# Patient Record
Sex: FEMALE | Race: BLACK OR AFRICAN AMERICAN | HISPANIC OR LATINO | Employment: FULL TIME | URBAN - NONMETROPOLITAN AREA
[De-identification: names, ages, dates, MRNs, and addresses within clinical notes are randomized per-mention and may not be internally consistent; named-entity substitution may affect disease eponyms.]

---

## 2022-09-12 ENCOUNTER — OFFICE VISIT (OUTPATIENT)
Dept: URGENT CARE | Facility: CLINIC | Age: 37
End: 2022-09-12
Payer: COMMERCIAL

## 2022-09-12 ENCOUNTER — APPOINTMENT (OUTPATIENT)
Dept: RADIOLOGY | Facility: CLINIC | Age: 37
End: 2022-09-12
Payer: COMMERCIAL

## 2022-09-12 VITALS
OXYGEN SATURATION: 94 % | HEART RATE: 78 BPM | TEMPERATURE: 98 F | RESPIRATION RATE: 18 BRPM | SYSTOLIC BLOOD PRESSURE: 141 MMHG | HEIGHT: 67 IN | BODY MASS INDEX: 36.1 KG/M2 | WEIGHT: 230 LBS | DIASTOLIC BLOOD PRESSURE: 72 MMHG

## 2022-09-12 DIAGNOSIS — M79.662 PAIN IN LEFT SHIN: ICD-10-CM

## 2022-09-12 DIAGNOSIS — M79.662 PAIN IN LEFT SHIN: Primary | ICD-10-CM

## 2022-09-12 PROCEDURE — 99203 OFFICE O/P NEW LOW 30 MIN: CPT

## 2022-09-12 PROCEDURE — 73590 X-RAY EXAM OF LOWER LEG: CPT

## 2022-09-12 RX ORDER — CHLORHEXIDINE GLUCONATE 0.12 MG/ML
RINSE ORAL
COMMUNITY
Start: 2022-09-06

## 2022-09-12 RX ORDER — NAPROXEN 500 MG/1
500 TABLET ORAL 2 TIMES DAILY WITH MEALS
Qty: 10 TABLET | Refills: 0 | Status: SHIPPED | OUTPATIENT
Start: 2022-09-12 | End: 2022-09-17

## 2022-09-12 RX ORDER — CLINDAMYCIN PALMITATE HYDROCHLORIDE 75 MG/5ML
SOLUTION ORAL
COMMUNITY
Start: 2022-09-06

## 2022-09-12 NOTE — PATIENT INSTRUCTIONS
Xr- no acute osseous abnormalities noted  Elevate legs  If swelling in one leg worsens, pain increases, shortness of breath develops proceed to ED

## 2022-09-12 NOTE — PROGRESS NOTES
[facility]        NAME: Donovan Mccarty is a 40 y o  female  : 1985    MRN: [id number]  DATE: 2022  TIME: 1:26 PM    Assessment and Plan   Pain in left shin [M79 [id number]]  1  Pain in left shin  XR tibia fibula 2 vw left    naproxen (Naprosyn) 500 mg tablet     Wells score -2  Patient requesting xr  Will perform given point tenderness  Xr: no acute osseous abnormality    Patient Instructions     Xr- no acute osseous abnormalities noted  Elevate legs  If swelling in one leg worsens, pain increases, shortness of breath develops proceed to ED  Follow up with PCP in 3-5 days  Proceed to  ER if symptoms worsen  Chief Complaint     Chief Complaint   Patient presents with    Ankle Pain    Joint Swelling     Left            History of Present Illness       Patient is a 40year old female who presents to the office today for left ankle swelling  States she noticed it two nights ago  Denies any history of blood clot in self or family  Denies hx of cancer, estrogen use  Denies recent travel  Had oral surgery 2 weeks ago for about 30 minutes  Denies any trauma to the foot or ankle  Review of Systems   Review of Systems   Constitutional: Negative for chills and fever  Respiratory: Negative for cough, shortness of breath and wheezing  Cardiovascular: Positive for leg swelling  Negative for chest pain and palpitations  Musculoskeletal: Negative for arthralgias, gait problem, myalgias, neck pain and neck stiffness  Skin: Negative for color change, rash and wound  All other systems reviewed and are negative          Current Medications       Current Outpatient Medications:     naproxen (Naprosyn) 500 mg tablet, Take 1 tablet (500 mg total) by mouth 2 (two) times a day with meals for 5 days, Disp: 10 tablet, Rfl: 0    chlorhexidine (PERIDEX) 0 12 % solution, , Disp: , Rfl:     clindamycin (CLEOCIN) 75 mg/5 mL solution, , Disp: , Rfl:     ibuprofen (MOTRIN) 100 mg/5 mL suspension, , Disp: , Rfl:     Current Allergies     Allergies as of 2022    (No Known Allergies)            The following portions of the patient's history were reviewed and updated as appropriate: allergies, current medications, past family history, past medical history, past social history, past surgical history and problem list      Past Medical History:   Diagnosis Date    Afib [facility])        Past Surgical History:   Procedure Laterality Date     SECTION      CHOLECYSTECTOMY         History reviewed  No pertinent family history  Medications have been verified  Objective   /72   Pulse 78   Temp 98 °F (36 7 °C)   Resp 18   Ht 5' 7" (1 702 m)   Wt 104 kg (230 lb)   LMP 2022   SpO2 94%   BMI 36 02 kg/m²        Physical Exam     Physical Exam  Vitals and nursing note reviewed  Constitutional:       General: She is not in acute distress  Appearance: Normal appearance  She is normal weight  She is not ill-appearing or toxic-appearing  Cardiovascular:      Rate and Rhythm: Normal rate and regular rhythm  Pulses: Normal pulses  Heart sounds: Normal heart sounds  Pulmonary:      Effort: Pulmonary effort is normal       Breath sounds: Normal breath sounds  Musculoskeletal:         General: Swelling and tenderness present  No deformity or signs of injury  Right lower leg: No deformity, lacerations, tenderness or bony tenderness  No edema  Left lower leg: Tenderness present  No deformity, lacerations or bony tenderness  No edema  Legs:    Skin:     General: Skin is warm  Capillary Refill: Capillary refill takes less than 2 seconds  Neurological:      General: No focal deficit present  Mental Status: She is alert and oriented to person, place, and time

## 2022-11-08 ENCOUNTER — APPOINTMENT (OUTPATIENT)
Dept: RADIOLOGY | Facility: CLINIC | Age: 37
End: 2022-11-08

## 2022-11-08 ENCOUNTER — OFFICE VISIT (OUTPATIENT)
Dept: URGENT CARE | Facility: CLINIC | Age: 37
End: 2022-11-08

## 2022-11-08 VITALS
SYSTOLIC BLOOD PRESSURE: 116 MMHG | WEIGHT: 235.2 LBS | DIASTOLIC BLOOD PRESSURE: 65 MMHG | BODY MASS INDEX: 36.84 KG/M2 | RESPIRATION RATE: 20 BRPM | HEART RATE: 66 BPM | OXYGEN SATURATION: 100 % | TEMPERATURE: 97.5 F

## 2022-11-08 DIAGNOSIS — M25.562 ACUTE PAIN OF LEFT KNEE: ICD-10-CM

## 2022-11-08 DIAGNOSIS — S83.8X2A SPRAIN OF OTHER LIGAMENT OF LEFT KNEE, INITIAL ENCOUNTER: Primary | ICD-10-CM

## 2022-11-08 NOTE — PATIENT INSTRUCTIONS
Knee Sprain   WHAT YOU NEED TO KNOW:   A knee sprain is a stretched or torn ligament in your knee  Ligaments support the knee and keep the joint and bones in the correct position  A knee sprain may involve one or more ligaments  DISCHARGE INSTRUCTIONS:   Return to the emergency department if:   Any part of your leg feels cold, numb, or looks pale  Call your doctor if:   You have new or increased swelling, bruising, or pain in your knee  Your symptoms do not improve within 6 weeks, even with treatment  You have questions or concerns about your condition or care  Medicines:   NSAIDs , such as ibuprofen, help decrease swelling, pain, and fever  This medicine is available with or without a doctor's order  NSAIDs can cause stomach bleeding or kidney problems in certain people  If you take blood thinner medicine, always ask your healthcare provider if NSAIDs are safe for you  Always read the medicine label and follow directions  Acetaminophen  decreases pain and fever  It is available without a doctor's order  Ask how much to take and how often to take it  Follow directions  Read the labels of all other medicines you are using to see if they also contain acetaminophen, or ask your doctor or pharmacist  Acetaminophen can cause liver damage if not taken correctly  Do not use more than 4 grams (4,000 milligrams) total of acetaminophen in one day  Prescription pain medicine  may be given  Ask your healthcare provider how to take this medicine safely  Some prescription pain medicines contain acetaminophen  Do not take other medicines that contain acetaminophen without talking to your healthcare provider  Too much acetaminophen may cause liver damage  Prescription pain medicine may cause constipation  Ask your healthcare provider how to prevent or treat constipation  Take your medicine as directed    Contact your healthcare provider if you think your medicine is not helping or if you have side effects  Tell him or her if you are allergic to any medicine  Keep a list of the medicines, vitamins, and herbs you take  Include the amounts, and when and why you take them  Bring the list or the pill bottles to follow-up visits  Carry your medicine list with you in case of an emergency  A support device  such as a splint or brace may be needed  These devices limit movement and protect the joint while it heals  You may be given crutches to use until you can stand on your injured leg without pain  Physical therapy  may be needed  A physical therapist teaches you exercises to help improve movement and strength, and to decrease pain  Manage a knee sprain:   Rest  your knee and do not exercise  Do not walk on your injured leg if you are told to keep weight off your knee  Rest helps decrease swelling and allows the injury to heal  You can do gentle range of motion exercises as directed to prevent stiffness  Apply ice  on your knee for 15 to 20 minutes every hour or as directed  Use an ice pack, or put crushed ice in a plastic bag  Cover the bag with a towel before you apply it  Ice helps prevent tissue damage and decreases swelling and pain  Apply compression  to your knee as directed  You may need to wear an elastic bandage  This helps keep your injured knee from moving too much while it heals  It should be tight enough to give support but so tight that it causes your toes to feel numb or tingly  Take the bandage off and rewrap it at least 1 time each day  Elevate your knee  above the level of your heart as often as you can  This will help decrease swelling and pain  Prop your leg on pillows or blankets to keep it elevated comfortably  Do not put pillows directly behind your knee  Prevent another knee sprain:  Exercise your legs to keep your muscles strong  Strong leg muscles help protect your knee and prevent strain   The following may also prevent a knee sprain:  Slowly start your exercise or training program   Slowly increase the time, distance, and intensity of your exercise  Sudden increases in training may cause another knee sprain  Wear protective braces and equipment as directed  Braces may prevent your knee from moving the wrong way and causing another sprain  Protective equipment may support your bones and ligaments to prevent injury  Warm up and stretch before exercise  Warm up by walking or using an exercise bike before starting your regular exercise  Do gentle stretches after warming up  This helps to loosen your muscles and decrease stress on your knee  Cool down and stretch after you exercise  Wear shoes that fit correctly and support your feet  Replace your running or exercise shoes before the padding or shock absorption is worn out  Ask your healthcare provider which exercise shoes are best for you  Ask if you should wear shoe inserts  Shoe inserts can help support your heels and arches or keep your foot lined up correctly in your shoes  Exercise on flat surfaces  Follow up with your doctor as directed:  Write down your questions so you remember to ask them during your visits  © Copyright [address] 2022 Information is for End User's use only and may not be sold, redistributed or otherwise used for commercial purposes  All illustrations and images included in CareNotes® are the copyrighted property of A D A M [employer] Inc  or Lizbeth Da Silva   The above information is an  only  It is not intended as medical advice for individual conditions or treatments  Talk to your doctor, nurse or pharmacist before following any medical regimen to see if it is safe and effective for you

## 2022-11-08 NOTE — PROGRESS NOTES
[facility]        NAME: Mikal James is a 40 y o  female  : 1985    MRN: [id number]  DATE: 2022  TIME: 9:23 AM    Assessment and Plan   Sprain of other ligament of left knee, initial encounter [S83 8X2A]  1  Sprain of other ligament of left knee, initial encounter  Ambulatory Referral to Physical Therapy   2  Acute pain of left knee  XR knee 3 vw left non injury         Patient Instructions   Patient Instructions     Knee Sprain   WHAT YOU NEED TO KNOW:   A knee sprain is a stretched or torn ligament in your knee  Ligaments support the knee and keep the joint and bones in the correct position  A knee sprain may involve one or more ligaments  DISCHARGE INSTRUCTIONS:   Return to the emergency department if:   · Any part of your leg feels cold, numb, or looks pale  Call your doctor if:   · You have new or increased swelling, bruising, or pain in your knee  · Your symptoms do not improve within 6 weeks, even with treatment  · You have questions or concerns about your condition or care  Medicines:   · NSAIDs , such as ibuprofen, help decrease swelling, pain, and fever  This medicine is available with or without a doctor's order  NSAIDs can cause stomach bleeding or kidney problems in certain people  If you take blood thinner medicine, always ask your healthcare provider if NSAIDs are safe for you  Always read the medicine label and follow directions  · Acetaminophen  decreases pain and fever  It is available without a doctor's order  Ask how much to take and how often to take it  Follow directions  Read the labels of all other medicines you are using to see if they also contain acetaminophen, or ask your doctor or pharmacist  Acetaminophen can cause liver damage if not taken correctly  Do not use more than 4 grams (4,000 milligrams) total of acetaminophen in one day  · Prescription pain medicine  may be given   Ask your healthcare provider how to take this medicine safely  Some prescription pain medicines contain acetaminophen  Do not take other medicines that contain acetaminophen without talking to your healthcare provider  Too much acetaminophen may cause liver damage  Prescription pain medicine may cause constipation  Ask your healthcare provider how to prevent or treat constipation  · Take your medicine as directed  Contact your healthcare provider if you think your medicine is not helping or if you have side effects  Tell him or her if you are allergic to any medicine  Keep a list of the medicines, vitamins, and herbs you take  Include the amounts, and when and why you take them  Bring the list or the pill bottles to follow-up visits  Carry your medicine list with you in case of an emergency  A support device  such as a splint or brace may be needed  These devices limit movement and protect the joint while it heals  You may be given crutches to use until you can stand on your injured leg without pain  Physical therapy  may be needed  A physical therapist teaches you exercises to help improve movement and strength, and to decrease pain  Manage a knee sprain:   · Rest  your knee and do not exercise  Do not walk on your injured leg if you are told to keep weight off your knee  Rest helps decrease swelling and allows the injury to heal  You can do gentle range of motion exercises as directed to prevent stiffness  · Apply ice  on your knee for 15 to 20 minutes every hour or as directed  Use an ice pack, or put crushed ice in a plastic bag  Cover the bag with a towel before you apply it  Ice helps prevent tissue damage and decreases swelling and pain  · Apply compression  to your knee as directed  You may need to wear an elastic bandage  This helps keep your injured knee from moving too much while it heals  It should be tight enough to give support but so tight that it causes your toes to feel numb or tingly   Take the bandage off and rewrap it at least 1 time each day  · Elevate your knee  above the level of your heart as often as you can  This will help decrease swelling and pain  Prop your leg on pillows or blankets to keep it elevated comfortably  Do not put pillows directly behind your knee  Prevent another knee sprain:  Exercise your legs to keep your muscles strong  Strong leg muscles help protect your knee and prevent strain  The following may also prevent a knee sprain:  · Slowly start your exercise or training program   Slowly increase the time, distance, and intensity of your exercise  Sudden increases in training may cause another knee sprain  · Wear protective braces and equipment as directed  Braces may prevent your knee from moving the wrong way and causing another sprain  Protective equipment may support your bones and ligaments to prevent injury  · Warm up and stretch before exercise  Warm up by walking or using an exercise bike before starting your regular exercise  Do gentle stretches after warming up  This helps to loosen your muscles and decrease stress on your knee  Cool down and stretch after you exercise  · Wear shoes that fit correctly and support your feet  Replace your running or exercise shoes before the padding or shock absorption is worn out  Ask your healthcare provider which exercise shoes are best for you  Ask if you should wear shoe inserts  Shoe inserts can help support your heels and arches or keep your foot lined up correctly in your shoes  Exercise on flat surfaces  Follow up with your doctor as directed:  Write down your questions so you remember to ask them during your visits  © Copyright [employer] 2022 Information is for End User's use only and may not be sold, redistributed or otherwise used for commercial purposes  All illustrations and images included in CareNotes® are the copyrighted property of [employer]  or Lizbeth Jenkins  The above information is an  only   It is not intended as medical advice for individual conditions or treatments  Talk to your doctor, nurse or pharmacist before following any medical regimen to see if it is safe and effective for you  Follow up with PCP in 3-5 days  Proceed to  ER if symptoms worsen  Chief Complaint     Chief Complaint   Patient presents with   • Knee Pain     Comes and goes  No injury  History of Present Illness       The patient presents to the clinic complaining of pain in the left knee for several months  She states that she also has been having bilateral lower leg swelling  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   She states the symptoms seem to be worse when she does a lot of physical work  She states that her swelling does improve when she elevates her leg  She denies any specific injury  She was seen in the clinic in September complaining of pain and swelling of the left lower leg and ankle  X-rays were done at that time that were negative  She states that she did have a follow-up with the primary care doctor and had an ultrasound that ruled out a DVT  She states that she does not like to take anti-inflammatories therefore she is not taking very many medications for her symptoms  She is not tried any braces for her symptoms  Her current pain level is proximally 8/10 and she describes the pain as a sharp pain  She denies chest pains, shortness of breath, or hemoptysis  Review of Systems   Review of Systems   Musculoskeletal: Positive for arthralgias and joint swelling  Skin: Negative for color change and wound  Current Medications     No current outpatient medications on file      Current Allergies     Allergies as of 11/08/2022   • (No Known Allergies)            The following portions of the patient's history were reviewed and updated as appropriate: allergies, current medications, past family history, past medical history, past social history, past surgical history and problem list      Past Medical History:   Diagnosis Date   • Afib ([address] )    • Anxiety        Past Surgical History:   Procedure Laterality Date   •  SECTION     • CHOLECYSTECTOMY         History reviewed  No pertinent family history  Medications have been verified  Objective   /65   Pulse 66   Temp 97 5 °F (36 4 °C)   Resp 20   Wt 107 kg (235 lb 3 2 oz)   SpO2 100%   BMI 36 84 kg/m²        Physical Exam     Physical Exam  Constitutional:       Appearance: Normal appearance  Musculoskeletal:      Left hip: No tenderness  Normal range of motion  Left upper leg: No swelling, edema or tenderness  Left knee: No swelling, erythema or bony tenderness  Normal range of motion  Tenderness present  Instability Tests: Anterior drawer test negative  Posterior drawer test negative  Anterior Lachman test negative  Medial Alex test negative and lateral Alex test negative  Left lower leg: No swelling or deformity  Left ankle: Normal       Left Achilles Tendon: No tenderness or defects  Atwood's test negative  Left foot: Normal range of motion and normal capillary refill  No swelling or deformity  Normal pulse  Legs:       Comments: -mild tenderness is noted in the lateral left knee   Neurological:      Mental Status: She is alert  -x-ray was reviewed  There is no acute fracture or significant arthritis noted  The patient will be treated with a neoprene knee brace  I will start her with physical therapy  I suggest ortho if symptoms fail to improve

## 2022-11-16 ENCOUNTER — EVALUATION (OUTPATIENT)
Dept: PHYSICAL THERAPY | Facility: CLINIC | Age: 37
End: 2022-11-16

## 2022-11-16 DIAGNOSIS — M25.512 CHRONIC LEFT SHOULDER PAIN: ICD-10-CM

## 2022-11-16 DIAGNOSIS — G89.29 CHRONIC LEFT SHOULDER PAIN: ICD-10-CM

## 2022-11-16 DIAGNOSIS — S83.8X2A SPRAIN OF OTHER LIGAMENT OF LEFT KNEE, INITIAL ENCOUNTER: Primary | ICD-10-CM

## 2022-11-16 NOTE — PROGRESS NOTES
PT Evaluation     Today's date: 2022  Patient name: Adalgisa Szymanski  : 1985  MRN: [id number]  Referring provider: Cheli Damico PA-C  Dx:   Encounter Diagnosis     ICD-10-CM    1  Sprain of other ligament of left knee, initial encounter  S83 8X2A Ambulatory Referral to Physical Therapy                     Assessment  Assessment details: Patient is a 40year old female who presents to PT with c/o pain in left shoulder and knee  PT examination shows limitations including weakness, limited stability, decreased postural awareness and increased pain and muscle tightness limiting functional ability  Patient would benefit from PT intervention including exercises for rom, strength and stability, functional training, manual therapy, HEP, balance training, postural training, aerobic conditioning and pain relieving modalities in order to maximize functional independence  Impairments: abnormal gait, abnormal muscle tone, activity intolerance, impaired balance, impaired physical strength, lacks appropriate home exercise program, pain with function and poor posture     Goals  ST  Initiate HEP  2  Patient to report decreased pain at worst by 50% in 4 weeks    LT  Patient to report decreased pain at worst by % in 8 weeks  2  Patient to increase left shoulder and knee strength to 5/5 in 8 weeks  3  Patient to improve postural awareness to [facility] in 8 weeks  4   Patient to return to normal functional activity in 8 weeks    Plan  Patient would benefit from: PT eval and skilled physical therapy  Planned modality interventions: cryotherapy, thermotherapy: hydrocollator packs, ultrasound and unattended electrical stimulation  Other planned modality interventions: Modalities PRN  Planned therapy interventions: IADL retraining, ADL retraining, joint mobilization, manual therapy, massage, balance, neuromuscular re-education, patient education, postural training, strengthening, stretching, therapeutic activities, therapeutic exercise, therapeutic training, functional ROM exercises, flexibility, graded activity, graded exercise and home exercise program  Frequency: 2x week  Duration in visits: 8  Duration in weeks: 4  Treatment plan discussed with: patient        Subjective Evaluation    History of Present Illness  Date of onset: 2022  Mechanism of injury: Patient presents to PT with c/o pain in left knee and left shoulder  Patient reports her left knee pain began about 3 months ago without known injury  She reports she gets the pain in the back of the calf and up into her posterior thigh  She states she feels the pain more at the end of the day and with increased activity  Patient also reports pain in left shoulder blade that she has had for at least 2 years  Patient is left hand dominant and uses her arm a lot with activity, especially painting  She reports she feels increased "knots" in her left shoulder blade  Patient is now referred to oppt  Pain  At best pain ratin  At worst pain ratin  Aggravating factors: walking, standing and stair climbing  Progression: worsening      Diagnostic Tests  X-ray: normal (Knee x-rays normal)  Patient Goals  Patient goal: Improved mobility        Objective     Concurrent Complaints  Negative for night pain, disturbed sleep, dizziness, faints, headaches, nausea/motion sickness, tinnitus, trouble swallowing, difficulty breathing, shortness of breath, respiratory pain, visual change, cardiac problem, kidney problem, gallbladder problem, stomach problem, ulcer, appendix problem, spleen problem, pancreas problem, history of cancer, history of trauma and infection    Static Posture     Head  Forward  Shoulders  Rounded  Palpation   Left   Muscle spasm in the cervical paraspinals, levator scapulae, rhomboids and upper trapezius       Neurological Testing     Sensation   Cervical/Thoracic   Left   Intact: light touch    Right   Intact: light touch    Knee   Left Knee   Intact: light touch    Right Knee   Intact: light touch     Active Range of Motion   Cervical/Thoracic Spine     Normal active range of motion  Left Knee   Normal active range of motion    Right Knee   Normal active range of motion    Strength/Myotome Testing   Cervical Spine     Right   Normal strength    Left Shoulder     Planes of Motion   Flexion: 4   Abduction: 4   External rotation at 0°: 4   Internal rotation at 0°: 4+     Left Knee   Flexion: 4  Extension: 4    Right Knee   Normal strength    Tests     Left Knee   Negative anterior Lachman, lateral Alex, medial Alex, posterior Lachman, valgus stress test at 0 degrees and varus stress test at 0 degrees  Ambulation     Observational Gait   Gait: antalgic   Decreased walking speed, stride length, left stance time and left step length       General Comments:    Upper quarter screen   Shoulder: unremarkable  Elbow: unremarkable  Hand/wrist: unremarkable  Neuro Exam:     Headaches   Patient reports headaches: No                  Precautions None       Manuals 11/16       Left LE Stretch 10 min                               Neuro Re-Ed         UBE-retro        MTP/LTP        Scaption        SLS        Tandem Stance                                                        Ther Ex        [facility]        TR/HR        Standing SLR 3-way        Shrugs        Bicep Curls        1/4 Squats                                                        Ther Activity                        Gait Training                        Modalities

## 2022-11-16 NOTE — LETTER
2022    Roly Hooper DO  [address] [place] [address]    Patient: Rose Weiss   YOB: 1985   Date of Visit: 2022     Encounter Diagnosis     ICD-10-CM    1  Sprain of other ligament of left knee, initial encounter  Q16 [id number] Ambulatory Referral to Physical Therapy     PT plan of care cert/re-cert      2  Chronic left shoulder pain  M25 512 PT plan of care cert/re-cert    D63 76           Dear Dr Aaron Burgos: Thank you for your recent referral of Rose Weiss  Please review the attached evaluation summary from [facility] recent visit  Please verify that you agree with the plan of care by signing the attached order  If you have any questions or concerns, please do not hesitate to call  I sincerely appreciate the opportunity to share in the care of one of your patients and hope to have another opportunity to work with you in the near future  Sincerely,    Rojas Eastman, PT      Referring Provider:      I certify that I have read the below Plan of Care and certify the need for these services furnished under this plan of treatment while under my care  Roly Hooper DO  [address] [place] [address]  Via Fax: [phone]          PT Evaluation     Today's date: 2022  Patient name: Rose Weiss  : 1985  MRN: [id number]  Referring provider: Nitin Lundy PA-C  Dx:   Encounter Diagnosis     ICD-10-CM    1  Sprain of other ligament of left knee, initial encounter  S83 8X2A Ambulatory Referral to Physical Therapy                     Assessment  Assessment details: Patient is a 40year old female who presents to PT with c/o pain in left shoulder and knee  PT examination shows limitations including weakness, limited stability, decreased postural awareness and increased pain and muscle tightness limiting functional ability   Patient would benefit from PT intervention including exercises for rom, strength and stability, functional training, manual therapy, HEP, balance training, postural training, aerobic conditioning and pain relieving modalities in order to maximize functional independence  Impairments: abnormal gait, abnormal muscle tone, activity intolerance, impaired balance, impaired physical strength, lacks appropriate home exercise program, pain with function and poor posture     Goals  ST  Initiate HEP  2  Patient to report decreased pain at worst by 50% in 4 weeks    LT  Patient to report decreased pain at worst by % in 8 weeks  2  Patient to increase left shoulder and knee strength to 5/5 in 8 weeks  3  Patient to improve postural awareness to [facility] in 8 weeks  4  Patient to return to normal functional activity in 8 weeks    Plan  Patient would benefit from: PT eval and skilled physical therapy  Planned modality interventions: cryotherapy, thermotherapy: hydrocollator packs, ultrasound and unattended electrical stimulation  Other planned modality interventions: Modalities PRN  Planned therapy interventions: IADL retraining, ADL retraining, joint mobilization, manual therapy, massage, balance, neuromuscular re-education, patient education, postural training, strengthening, stretching, therapeutic activities, therapeutic exercise, therapeutic training, functional ROM exercises, flexibility, graded activity, graded exercise and home exercise program  Frequency: 2x week  Duration in visits: 8  Duration in weeks: 4  Treatment plan discussed with: patient        Subjective Evaluation    History of Present Illness  Date of onset: 2022  Mechanism of injury: Patient presents to PT with c/o pain in left knee and left shoulder  Patient reports her left knee pain began about 3 months ago without known injury  She reports she gets the pain in the back of the calf and up into her posterior thigh  She states she feels the pain more at the end of the day and with increased activity   Patient also reports pain in left shoulder blade that she has had for at least 2 years  Patient is left hand dominant and uses her arm a lot with activity, especially painting  She reports she feels increased "knots" in her left shoulder blade  Patient is now referred to oppt  Pain  At best pain ratin  At worst pain ratin  Aggravating factors: walking, standing and stair climbing  Progression: worsening      Diagnostic Tests  X-ray: normal (Knee x-rays normal)  Patient Goals  Patient goal: Improved mobility        Objective     Concurrent Complaints  Negative for night pain, disturbed sleep, dizziness, faints, headaches, nausea/motion sickness, tinnitus, trouble swallowing, difficulty breathing, shortness of breath, respiratory pain, visual change, cardiac problem, kidney problem, gallbladder problem, stomach problem, ulcer, appendix problem, spleen problem, pancreas problem, history of cancer, history of trauma and infection    Static Posture     Head  Forward  Shoulders  Rounded  Palpation   Left   Muscle spasm in the cervical paraspinals, levator scapulae, rhomboids and upper trapezius  Neurological Testing     Sensation   Cervical/Thoracic   Left   Intact: light touch    Right   Intact: light touch    Knee   Left Knee   Intact: light touch    Right Knee   Intact: light touch     Active Range of Motion   Cervical/Thoracic Spine     Normal active range of motion  Left Knee   Normal active range of motion    Right Knee   Normal active range of motion    Strength/Myotome Testing   Cervical Spine     Right   Normal strength    Left Shoulder     Planes of Motion   Flexion: 4   Abduction: 4   External rotation at 0°: 4   Internal rotation at 0°: 4+     Left Knee   Flexion: 4  Extension: 4    Right Knee   Normal strength    Tests     Left Knee   Negative anterior Lachman, lateral Alex, medial Alex, posterior Lachman, valgus stress test at 0 degrees and varus stress test at 0 degrees       Ambulation Observational Gait   Gait: antalgic   Decreased walking speed, stride length, left stance time and left step length       General Comments:    Upper quarter screen   Shoulder: unremarkable  Elbow: unremarkable  Hand/wrist: unremarkable  Neuro Exam:     Headaches   Patient reports headaches: No                Precautions None       Manuals 11/16       Left LE Stretch 10 min                               Neuro Re-Ed         UBE-retro        MTP/LTP        Scaption        SLS        Tandem Stance                                                        Ther Ex        [address]        TR/HR        Standing SLR 3-way        Shrugs        Bicep Curls        1/4 Squats                                                        Ther Activity                        Gait Training                        Modalities

## 2022-11-21 ENCOUNTER — APPOINTMENT (OUTPATIENT)
Dept: PHYSICAL THERAPY | Facility: CLINIC | Age: 37
End: 2022-11-21

## 2022-11-23 ENCOUNTER — OFFICE VISIT (OUTPATIENT)
Dept: PHYSICAL THERAPY | Facility: CLINIC | Age: 37
End: 2022-11-23

## 2022-11-23 DIAGNOSIS — G89.29 CHRONIC LEFT SHOULDER PAIN: ICD-10-CM

## 2022-11-23 DIAGNOSIS — M25.512 CHRONIC LEFT SHOULDER PAIN: ICD-10-CM

## 2022-11-23 DIAGNOSIS — S83.8X2A SPRAIN OF OTHER LIGAMENT OF LEFT KNEE, INITIAL ENCOUNTER: Primary | ICD-10-CM

## 2022-11-23 NOTE — PROGRESS NOTES
Daily Note     Today's date: 2022  Patient name: Katarzyna Quinn  : 1985  MRN: [id number]  Referring provider: Jluis Pizano PA-C  Dx:   Encounter Diagnosis     ICD-10-CM    1  Sprain of other ligament of left knee, initial encounter  S83 8X2A       2  Chronic left shoulder pain  M25 512     G89 29                      Subjective: Patient reports pain left medial thoracic spine and left lumbar region secondary to painting  Objective: See treatment diary below      Assessment: Tolerated treatment fair  Patient had large adhesion along left rhomboid  Initiated E/S with MHP to decrease pain  Plan: Continue per plan of care          Precautions None       Manuals 22      Left LE Stretch 10 min       TFM/STM to left rhomboid and thoracic paraspinals  15 min                      Neuro Re-Ed         UBE-retro  5 min      MTP/LTP        Scaption        SLS        Tandem Stance                                                        Ther Ex        SRC        TR/HR        Standing SLR 3-way        Shrugs  2x10      Bicep Curls        1/4 Squats                                                        Ther Activity                        Gait Training                        Modalities

## 2022-12-01 ENCOUNTER — OFFICE VISIT (OUTPATIENT)
Dept: PHYSICAL THERAPY | Facility: CLINIC | Age: 37
End: 2022-12-01

## 2022-12-01 DIAGNOSIS — G89.29 CHRONIC LEFT SHOULDER PAIN: ICD-10-CM

## 2022-12-01 DIAGNOSIS — S83.8X2A SPRAIN OF OTHER LIGAMENT OF LEFT KNEE, INITIAL ENCOUNTER: Primary | ICD-10-CM

## 2022-12-01 DIAGNOSIS — M25.512 CHRONIC LEFT SHOULDER PAIN: ICD-10-CM

## 2022-12-01 NOTE — PROGRESS NOTES
Daily Note     Today's date: 2022  Patient name: Lizzette Olivas  : 1985  MRN: [id number]  Referring provider: Zac Jeffrey PA-C  Dx:   Encounter Diagnosis     ICD-10-CM    1  Sprain of other ligament of left knee, initial encounter  S83 8X2A       2  Chronic left shoulder pain  M25 512     G89 29                      Subjective: Patient reports improvement in left knee and in left shoulder  Objective: See treatment diary below      Assessment: Tolerated treatment well  Patient exhibited good technique with therapeutic exercises  Patient had decreased tightness/adhesions in left rhomboids/thoracic paraspinals  Plan: Continue per plan of care          Precautions None       Manuals 22     Left LE Stretch 10 min       TFM/STM to left rhomboid and thoracic paraspinals  15 min 15 min                     Neuro Re-Ed         UBE-retro  5 min 5 min     MTP/LTP   RTB 2x10 each     Scaption   2x10     SLS        Tandem Stance                                                        Ther Ex        SRC        TR/HR        Standing SLR 3-way        Shrugs  2x10 2x10     Bicep Curls        1/4 Squats                                                        Ther Activity                        Gait Training                        Modalities        MHP/ES to Left shoulder  15 min 15 min

## 2023-05-23 ENCOUNTER — OFFICE VISIT (OUTPATIENT)
Dept: URGENT CARE | Facility: CLINIC | Age: 38
End: 2023-05-23

## 2023-05-23 VITALS
BODY MASS INDEX: 36.84 KG/M2 | OXYGEN SATURATION: 97 % | DIASTOLIC BLOOD PRESSURE: 74 MMHG | SYSTOLIC BLOOD PRESSURE: 121 MMHG | HEART RATE: 82 BPM | HEIGHT: 67 IN | RESPIRATION RATE: 18 BRPM | TEMPERATURE: 97.8 F

## 2023-05-23 DIAGNOSIS — F41.9 ANXIETY: ICD-10-CM

## 2023-05-23 DIAGNOSIS — S83.8X2A SPRAIN OF OTHER LIGAMENT OF LEFT KNEE, INITIAL ENCOUNTER: Primary | ICD-10-CM

## 2023-05-23 NOTE — PROGRESS NOTES
[facility]        NAME: Quintin Delgado is a 45 y o  female  : 1985    MRN: [id number]  DATE: May 23, 2023  TIME: 9:37 AM    Assessment and Plan   Sprain of other ligament of left knee, initial encounter [S83 8X2A]  1  Sprain of other ligament of left knee, initial encounter  Ambulatory Referral to Physical Therapy    Ambulatory Referral to Orthopedic Surgery      2  Anxiety              Patient Instructions   Patient Instructions     Knee Sprain   WHAT YOU NEED TO KNOW:   A knee sprain is a stretched or torn ligament in your knee  Ligaments support the knee and keep the joint and bones in the correct position  A knee sprain may involve one or more ligaments  DISCHARGE INSTRUCTIONS:   Return to the emergency department if:   • Any part of your leg feels cold, numb, or looks pale  Call your doctor if:   • You have new or increased swelling, bruising, or pain in your knee  • Your symptoms do not improve within 6 weeks, even with treatment  • You have questions or concerns about your condition or care  Medicines:   • NSAIDs , such as ibuprofen, help decrease swelling, pain, and fever  This medicine is available with or without a doctor's order  NSAIDs can cause stomach bleeding or kidney problems in certain people  If you take blood thinner medicine, always ask your healthcare provider if NSAIDs are safe for you  Always read the medicine label and follow directions  • Acetaminophen  decreases pain and fever  It is available without a doctor's order  Ask how much to take and how often to take it  Follow directions  Read the labels of all other medicines you are using to see if they also contain acetaminophen, or ask your doctor or pharmacist  Acetaminophen can cause liver damage if not taken correctly  • Prescription pain medicine  may be given  Ask your healthcare provider how to take this medicine safely  Some prescription pain medicines contain acetaminophen   Do not take other medicines that contain acetaminophen without talking to your healthcare provider  Too much acetaminophen may cause liver damage  Prescription pain medicine may cause constipation  Ask your healthcare provider how to prevent or treat constipation  • Take your medicine as directed  Contact your healthcare provider if you think your medicine is not helping or if you have side effects  Tell your provider if you are allergic to any medicine  Keep a list of the medicines, vitamins, and herbs you take  Include the amounts, and when and why you take them  Bring the list or the pill bottles to follow-up visits  Carry your medicine list with you in case of an emergency  A support device  such as a splint or brace may be needed  These devices limit movement and protect the joint while it heals  You may be given crutches to use until you can stand on your injured leg without pain  Physical therapy  may be needed  A physical therapist teaches you exercises to help improve movement and strength, and to decrease pain  Manage a knee sprain:   • Rest  your knee and do not exercise  Do not walk on your injured leg if you are told to keep weight off your knee  Rest helps decrease swelling and allows the injury to heal  You can do gentle range of motion exercises as directed to prevent stiffness  • Apply ice  on your knee for 15 to 20 minutes every hour or as directed  Use an ice pack, or put crushed ice in a plastic bag  Cover the bag with a towel before you apply it  Ice helps prevent tissue damage and decreases swelling and pain  • Apply compression  to your knee as directed  You may need to wear an elastic bandage  This helps keep your injured knee from moving too much while it heals  It should be tight enough to give support but so tight that it causes your toes to feel numb or tingly  Take the bandage off and rewrap it at least 1 time each day           • Elevate your knee  above the level of your heart as often as you can  This will help decrease swelling and pain  Prop your leg on pillows or blankets to keep it elevated comfortably  Do not put pillows directly behind your knee  Prevent another knee sprain:  Exercise your legs to keep your muscles strong  Strong leg muscles help protect your knee and prevent strain  The following may also prevent a knee sprain:  • Slowly start your exercise or training program   Slowly increase the time, distance, and intensity of your exercise  Sudden increases in training may cause another knee sprain  • Wear protective braces and equipment as directed  Braces may prevent your knee from moving the wrong way and causing another sprain  Protective equipment may support your bones and ligaments to prevent injury  • Warm up and stretch before exercise  Warm up by walking or using an exercise bike before starting your regular exercise  Do gentle stretches after warming up  This helps to loosen your muscles and decrease stress on your knee  Cool down and stretch after you exercise  • Wear shoes that fit correctly and support your feet  Replace your running or exercise shoes before the padding or shock absorption is worn out  Ask your healthcare provider which exercise shoes are best for you  Ask if you should wear shoe inserts  Shoe inserts can help support your heels and arches or keep your foot lined up correctly in your shoes  Exercise on flat surfaces  Follow up with your doctor as directed:  Write down your questions so you remember to ask them during your visits  © Copyright Tracy Conner 2022 Information is for End User's use only and may not be sold, redistributed or otherwise used for commercial purposes  The above information is an  only  It is not intended as medical advice for individual conditions or treatments   Talk to your doctor, nurse or pharmacist before following any medical regimen to see if it is safe and effective for you   Anxiety   WHAT YOU NEED TO KNOW:   Anxiety is a condition that causes you to feel extremely worried or nervous  The feelings are so strong that they can cause problems with your daily activities or sleep  Anxiety may be triggered by something you fear, or it may happen without a cause  Family or work stress, smoking, caffeine, and alcohol can increase your risk for anxiety  Certain medicines or health conditions can also increase your risk  Anxiety can become a long-term condition if it is not managed or treated  DISCHARGE INSTRUCTIONS:   Call your local emergency number (911 in the [address] [facility]) if:   • You have chest pain, tightness, or heaviness that may spread to your shoulders, arms, jaw, neck, or back  • You feel like hurting yourself or someone else  Call your doctor if:   • Your symptoms get worse or do not get better with treatment  • Your anxiety keeps you from doing your regular daily activities  • You have new symptoms since your last visit  • You have questions or concerns about your condition or care  Medicines:   • Medicines  may be given to help you feel more calm and relaxed, and decrease your symptoms  • Take your medicine as directed  Contact your healthcare provider if you think your medicine is not helping or if you have side effects  Tell your provider if you are allergic to any medicine  Keep a list of the medicines, vitamins, and herbs you take  Include the amounts, and when and why you take them  Bring the list or the pill bottles to follow-up visits  Carry your medicine list with you in case of an emergency  Manage anxiety:   • Talk to someone about your anxiety  Your healthcare provider may suggest counseling  Cognitive behavioral therapy can help you understand and change how you react to events that trigger your symptoms  You might feel more comfortable talking with a friend or family member about your anxiety   Choose someone you know will be supportive and encouraging  • Find ways to relax  Activities such as exercise, meditation, or listening to music can help you relax  Spend time with friends, or do things you enjoy  • Practice deep breathing  Deep breathing can help you relax when you feel anxious  Focus on taking slow, deep breaths several times a day, or during an anxiety attack  Breathe in through your nose and out through your mouth  • Create a regular sleep routine  Regular sleep can help you feel calmer during the day  Go to sleep and wake up at the same times every day  Do not watch television or use the computer right before bed  Your room should be comfortable, dark, and quiet  • Eat a variety of healthy foods  Healthy foods include fruits, vegetables, low-fat dairy products, lean meats, fish, whole-grain breads, and cooked beans  Healthy foods can help you feel less anxious and have more energy  • Exercise regularly  Exercise can increase your energy level  Exercise may also lift your mood and help you sleep better  Your healthcare provider can help you create an exercise plan  • Do not smoke  Nicotine and other chemicals in cigarettes and cigars can increase anxiety  Ask your healthcare provider for information if you currently smoke and need help to quit  E-cigarettes or smokeless tobacco still contain nicotine  Talk to your healthcare provider before you use these products  • Do not have caffeine  Caffeine can make your symptoms worse  Do not have foods or drinks that are meant to increase your energy level  • Limit or do not drink alcohol  Ask your healthcare provider if alcohol is safe for you  You may not be able to drink alcohol if you take certain anxiety or depression medicines  Limit alcohol to 1 drink per day if you are a woman  Limit alcohol to 2 drinks per day if you are a man  A drink of alcohol is 12 ounces of beer, 5 ounces of wine, or 1½ ounces of liquor  • Do not use drugs    Drugs can make your anxiety worse  It can also make anxiety hard to manage  Talk to your healthcare provider if you use drugs and want help to quit  Follow up with your doctor within 2 weeks or as directed:  Write down your questions so you remember to ask them during your visits  © Copyright Cecille Pleitez 2022 Information is for End User's use only and may not be sold, redistributed or otherwise used for commercial purposes  The above information is an  only  It is not intended as medical advice for individual conditions or treatments  Talk to your doctor, nurse or pharmacist before following any medical regimen to see if it is safe and effective for you  Follow up with PCP in 3-5 days  Proceed to  ER if symptoms worsen  Chief Complaint     Chief Complaint   Patient presents with   • Knee Pain     Left knee pain     • Anxiety         History of Present Illness       The patient is a 40-year-old female who presents to the clinic complaining of pain and swelling of the left knee  She was seen in November for the same symptoms  She had x-rays at that time that did not show any abnormal teas  She was referred to physical therapy for further evaluation  She states that she went through 5 sessions which did not completely resolve her symptoms but she did not finish the physical therapy as her son has autism and seizure disorder and she has to care 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 and has a hard time keeping appointments for herself  She states that she also gets associated swelling of her knee as well as associated swelling of her left ankle  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 due to her son's health issues  She also complains of associated instability at times  She only takes ibuprofen as needed  She states the pain can become sharp and very painful at times  She is also having anxiety but is not really interested in taking any medications    She complains of flushing "of her face and is concerned that this may be related to her anxiety but wanted to have her blood pressure checked today  She states that she has a hard time getting in to see her primary care doctor for follow-ups as they really have appointments available  Review of Systems   Review of Systems   Musculoskeletal: Positive for arthralgias and gait problem  Negative for joint swelling, myalgias, neck pain and neck stiffness  Skin: Negative for color change, pallor and rash  Neurological: Negative for dizziness and numbness  Psychiatric/Behavioral: Negative for agitation, behavioral problems, confusion, decreased concentration and dysphoric mood  The patient is nervous/anxious  Current Medications     No current outpatient medications on file  Current Allergies     Allergies as of 2023   • (No Known Allergies)            The following portions of the patient's history were reviewed and updated as appropriate: allergies, current medications, past family history, past medical history, past social history, past surgical history and problem list      Past Medical History:   Diagnosis Date   • Afib ([facility] [address] )    • Anxiety        Past Surgical History:   Procedure Laterality Date   •  SECTION     • CHOLECYSTECTOMY         History reviewed  No pertinent family history  Medications have been verified  Objective   /74   Pulse 82   Temp 97 8 °F (36 6 °C)   Resp 18   Ht 5' 7\" (1 702 m)   SpO2 97%   BMI 36 84 kg/m²        Physical Exam     Physical Exam  Constitutional:       Appearance: Normal appearance  Musculoskeletal:      Left hip: Normal       Left knee: Crepitus present  No swelling or erythema  Normal range of motion  Tenderness present  Normal alignment  Left lower leg: Normal       Left ankle: Normal       Left foot: Normal         Legs:       Comments: Mild tenderness is noted in the medial aspect of the left knee    There is no crepitus or decreased range " of motion  Neurological:      Mental Status: She is alert  Psychiatric:         Mood and Affect: Mood normal          Behavior: Behavior normal          Judgment: Judgment normal            Referred order to restart physical therapy  Placed a consult for Ortho since her knee pain has been going on for several months  The patient was instructed to follow-up with her primary care doctor for anxiety  I did give her information on anxiety

## 2023-05-23 NOTE — PATIENT INSTRUCTIONS
Knee Sprain   WHAT YOU NEED TO KNOW:   A knee sprain is a stretched or torn ligament in your knee  Ligaments support the knee and keep the joint and bones in the correct position  A knee sprain may involve one or more ligaments  DISCHARGE INSTRUCTIONS:   Return to the emergency department if:   Any part of your leg feels cold, numb, or looks pale  Call your doctor if:   You have new or increased swelling, bruising, or pain in your knee  Your symptoms do not improve within 6 weeks, even with treatment  You have questions or concerns about your condition or care  Medicines:   NSAIDs , such as ibuprofen, help decrease swelling, pain, and fever  This medicine is available with or without a doctor's order  NSAIDs can cause stomach bleeding or kidney problems in certain people  If you take blood thinner medicine, always ask your healthcare provider if NSAIDs are safe for you  Always read the medicine label and follow directions  Acetaminophen  decreases pain and fever  It is available without a doctor's order  Ask how much to take and how often to take it  Follow directions  Read the labels of all other medicines you are using to see if they also contain acetaminophen, or ask your doctor or pharmacist  Acetaminophen can cause liver damage if not taken correctly  Prescription pain medicine  may be given  Ask your healthcare provider how to take this medicine safely  Some prescription pain medicines contain acetaminophen  Do not take other medicines that contain acetaminophen without talking to your healthcare provider  Too much acetaminophen may cause liver damage  Prescription pain medicine may cause constipation  Ask your healthcare provider how to prevent or treat constipation  Take your medicine as directed  Contact your healthcare provider if you think your medicine is not helping or if you have side effects  Tell your provider if you are allergic to any medicine   Keep a list of the medicines, vitamins, and herbs you take  Include the amounts, and when and why you take them  Bring the list or the pill bottles to follow-up visits  Carry your medicine list with you in case of an emergency  A support device  such as a splint or brace may be needed  These devices limit movement and protect the joint while it heals  You may be given crutches to use until you can stand on your injured leg without pain  Physical therapy  may be needed  A physical therapist teaches you exercises to help improve movement and strength, and to decrease pain  Manage a knee sprain:   Rest  your knee and do not exercise  Do not walk on your injured leg if you are told to keep weight off your knee  Rest helps decrease swelling and allows the injury to heal  You can do gentle range of motion exercises as directed to prevent stiffness  Apply ice  on your knee for 15 to 20 minutes every hour or as directed  Use an ice pack, or put crushed ice in a plastic bag  Cover the bag with a towel before you apply it  Ice helps prevent tissue damage and decreases swelling and pain  Apply compression  to your knee as directed  You may need to wear an elastic bandage  This helps keep your injured knee from moving too much while it heals  It should be tight enough to give support but so tight that it causes your toes to feel numb or tingly  Take the bandage off and rewrap it at least 1 time each day  Elevate your knee  above the level of your heart as often as you can  This will help decrease swelling and pain  Prop your leg on pillows or blankets to keep it elevated comfortably  Do not put pillows directly behind your knee  Prevent another knee sprain:  Exercise your legs to keep your muscles strong  Strong leg muscles help protect your knee and prevent strain   The following may also prevent a knee sprain:  Slowly start your exercise or training program   Slowly increase the time, distance, and intensity of your exercise  Sudden increases in training may cause another knee sprain  Wear protective braces and equipment as directed  Braces may prevent your knee from moving the wrong way and causing another sprain  Protective equipment may support your bones and ligaments to prevent injury  Warm up and stretch before exercise  Warm up by walking or using an exercise bike before starting your regular exercise  Do gentle stretches after warming up  This helps to loosen your muscles and decrease stress on your knee  Cool down and stretch after you exercise  Wear shoes that fit correctly and support your feet  Replace your running or exercise shoes before the padding or shock absorption is worn out  Ask your healthcare provider which exercise shoes are best for you  Ask if you should wear shoe inserts  Shoe inserts can help support your heels and arches or keep your foot lined up correctly in your shoes  Exercise on flat surfaces  Follow up with your doctor as directed:  Write down your questions so you remember to ask them during your visits  © Copyright Eric Mcnally 2022 Information is for End User's use only and may not be sold, redistributed or otherwise used for commercial purposes  The above information is an  only  It is not intended as medical advice for individual conditions or treatments  Talk to your doctor, nurse or pharmacist before following any medical regimen to see if it is safe and effective for you  Anxiety   WHAT YOU NEED TO KNOW:   Anxiety is a condition that causes you to feel extremely worried or nervous  The feelings are so strong that they can cause problems with your daily activities or sleep  Anxiety may be triggered by something you fear, or it may happen without a cause  Family or work stress, smoking, caffeine, and alcohol can increase your risk for anxiety  Certain medicines or health conditions can also increase your risk   Anxiety can become a long-term condition if it is not managed or treated  DISCHARGE INSTRUCTIONS:   Call your local emergency number (911 in the [address]) if:   You have chest pain, tightness, or heaviness that may spread to your shoulders, arms, jaw, neck, or back  You feel like hurting yourself or someone else  Call your doctor if:   Your symptoms get worse or do not get better with treatment  Your anxiety keeps you from doing your regular daily activities  You have new symptoms since your last visit  You have questions or concerns about your condition or care  Medicines:   Medicines  may be given to help you feel more calm and relaxed, and decrease your symptoms  Take your medicine as directed  Contact your healthcare provider if you think your medicine is not helping or if you have side effects  Tell your provider if you are allergic to any medicine  Keep a list of the medicines, vitamins, and herbs you take  Include the amounts, and when and why you take them  Bring the list or the pill bottles to follow-up visits  Carry your medicine list with you in case of an emergency  Manage anxiety:   Talk to someone about your anxiety  Your healthcare provider may suggest counseling  Cognitive behavioral therapy can help you understand and change how you react to events that trigger your symptoms  You might feel more comfortable talking with a friend or family member about your anxiety  Choose someone you know will be supportive and encouraging  Find ways to relax  Activities such as exercise, meditation, or listening to music can help you relax  Spend time with friends, or do things you enjoy  Practice deep breathing  Deep breathing can help you relax when you feel anxious  Focus on taking slow, deep breaths several times a day, or during an anxiety attack  Breathe in through your nose and out through your mouth  Create a regular sleep routine  Regular sleep can help you feel calmer during the day   Go to sleep and wake up at the same times every day  Do not watch television or use the computer right before bed  Your room should be comfortable, dark, and quiet  Eat a variety of healthy foods  Healthy foods include fruits, vegetables, low-fat dairy products, lean meats, fish, whole-grain breads, and cooked beans  Healthy foods can help you feel less anxious and have more energy  Exercise regularly  Exercise can increase your energy level  Exercise may also lift your mood and help you sleep better  Your healthcare provider can help you create an exercise plan  Do not smoke  Nicotine and other chemicals in cigarettes and cigars can increase anxiety  Ask your healthcare provider for information if you currently smoke and need help to quit  E-cigarettes or smokeless tobacco still contain nicotine  Talk to your healthcare provider before you use these products  Do not have caffeine  Caffeine can make your symptoms worse  Do not have foods or drinks that are meant to increase your energy level  Limit or do not drink alcohol  Ask your healthcare provider if alcohol is safe for you  You may not be able to drink alcohol if you take certain anxiety or depression medicines  Limit alcohol to 1 drink per day if you are a woman  Limit alcohol to 2 drinks per day if you are a man  A drink of alcohol is 12 ounces of beer, 5 ounces of wine, or 1½ ounces of liquor  Do not use drugs  Drugs can make your anxiety worse  It can also make anxiety hard to manage  Talk to your healthcare provider if you use drugs and want help to quit  Follow up with your doctor within 2 weeks or as directed:  Write down your questions so you remember to ask them during your visits  © Copyright Vivien Fails 2022 Information is for End User's use only and may not be sold, redistributed or otherwise used for commercial purposes  The above information is an  only   It is not intended as medical advice for individual conditions or treatments  Talk to your doctor, nurse or pharmacist before following any medical regimen to see if it is safe and effective for you

## 2023-05-25 ENCOUNTER — OFFICE VISIT (OUTPATIENT)
Dept: OBGYN CLINIC | Facility: CLINIC | Age: 38
End: 2023-05-25

## 2023-05-25 VITALS
BODY MASS INDEX: 36.88 KG/M2 | DIASTOLIC BLOOD PRESSURE: 71 MMHG | HEART RATE: 78 BPM | HEIGHT: 67 IN | WEIGHT: 235 LBS | SYSTOLIC BLOOD PRESSURE: 105 MMHG

## 2023-05-25 DIAGNOSIS — M17.12 PRIMARY OSTEOARTHRITIS OF LEFT KNEE: Primary | ICD-10-CM

## 2023-05-25 DIAGNOSIS — S83.8X2A SPRAIN OF OTHER LIGAMENT OF LEFT KNEE, INITIAL ENCOUNTER: ICD-10-CM

## 2023-05-25 NOTE — PROGRESS NOTES
Assessment/Plan:   Diagnoses and all orders for this visit:    Primary osteoarthritis of left knee  -     Ambulatory Referral to Physical Therapy; Future    Sprain of other ligament of left knee, initial encounter  -     Ambulatory Referral to Orthopedic Surgery         Reviewed physical exam and imaging with patient on today's exam  Her symptoms are consistent with mild osteoarthritis of her left knee  Referred to physical therapy to improve strength and proprioception  A cortisone injection was offered, but the patient declined at this time  She will follow-up in 3 months for re-evaluation following physical therapy  The patient expresses understanding and is in agreement with today's treatment plan  The patient has osteoarthritis of her left knee  There is no specific joint line tenderness  Mild crepitations present  Treatment options were discussed  She was not interested in any injections  She wants to go back to therapy  A new prescription was written  Follow-up 3 months evaluation  If her condition changes, she would not hesitate to let us know    Subjective:   Patient ID: Judy Garcia  1985     HPI  Patient is a 45 y o  female who presents for initial evaluation of her left knee  The patient reported to Urgent Care on 5/23/23 for a sprain of her left knee  The patient also reported to Urgent Care on 11/8/22 for the same injury  She completed 3 physical therapy appointments throughout November 2022  On today's visit, she reports pain in her lateral and anterior knee  She states that she has pain with increased activity  The patient states that she works from work and has periods of prolonged sitting, which increases her pain  She states that she enjoyed physical therapy but she did not complete it for a long period of time  She denies numbness, tingling, weakness, feelings of instability, fever, chills, or malaise         The following portions of the patient's history were reviewed and updated as appropriate:  Past medical history, past surgical history, Family history, social history, current medications and allergies    Past Medical History:   Diagnosis Date   • Afib ([facility] [address] )    • Anxiety        Past Surgical History:   Procedure Laterality Date   •  SECTION     • CHOLECYSTECTOMY         History reviewed  No pertinent family history  Social History     Socioeconomic History   • Marital status: Single     Spouse name: None   • Number of children: None   • Years of education: None   • Highest education level: None   Occupational History   • None   Tobacco Use   • Smoking status: Former   • Smokeless tobacco: Never   Vaping Use   • Vaping Use: Every day   • Substances: Flavoring   Substance and Sexual Activity   • Alcohol use: Not Currently   • Drug use: Never   • Sexual activity: None   Other Topics Concern   • None   Social History Narrative   • None     Social Determinants of Health     Financial Resource Strain: Not on file   Food Insecurity: Not on file   Transportation Needs: Not on file   Physical Activity: Not on file   Stress: Not on file   Social Connections: Not on file   Intimate Partner Violence: Not on file   Housing Stability: Not on file       No current outpatient medications on file  No Known Allergies    Review of Systems   Constitutional: Negative for chills, fever and unexpected weight change  HENT: Negative for hearing loss, nosebleeds and sore throat  Eyes: Negative for pain, redness and visual disturbance  Respiratory: Negative for cough, shortness of breath and wheezing  Cardiovascular: Negative for chest pain, palpitations and leg swelling  Gastrointestinal: Negative for abdominal pain, nausea and vomiting  Endocrine: Negative for polydipsia and polyuria  Genitourinary: Negative for dysuria and hematuria  Skin: Negative for rash and wound  Neurological: Negative for dizziness, numbness and headaches     Psychiatric/Behavioral: Negative for "decreased concentration and suicidal ideas  The patient is not nervous/anxious  All other systems reviewed and are negative  Objective:  /71   Pulse 78   Ht 5' 7\" (1 702 m)   Wt 107 kg (235 lb)   BMI 36 81 kg/m²     Ortho Exam  left knee(s) -   Patient ambulates with steady gait pattern  Uses No assistive device  No anatomical deformity  Skin is warm and dry to touch with no signs of erythema, ecchymosis, or infection  No soft tissue swelling or effusion noted  ROM (5° - 115°)   MMT: 5/5 throughout  No tenderness to palpation on exam  Flexor and extensor mechanisms are intact   Knee is stable to varus and valgus stress  - Lachman's  - Anterior Drawer, - Posterior Drawer  - Medial Alex's, - Lateral Alex's  - Pivot Shift  Patella tracks centrally with palpable crepitus  Calf compartments are soft and supple  - Daniel's sign  2+ DP and PT pulses with brisk capillary refill to the toes  Sural, saphenous, tibial, superficial, and deep peroneal motor and sensory distributions intact  Sensation light touch intact distally      Physical Exam  HENT:      Head: Normocephalic and atraumatic  Nose: Nose normal    Eyes:      Conjunctiva/sclera: Conjunctivae normal    Cardiovascular:      Rate and Rhythm: Normal rate  Pulmonary:      Effort: Pulmonary effort is normal    Musculoskeletal:      Cervical back: Neck supple  Skin:     General: Skin is warm and dry  Capillary Refill: Capillary refill takes less than 2 seconds  Neurological:      Mental Status: She is alert and oriented to person, place, and time  Psychiatric:         Mood and Affect: Mood normal          Behavior: Behavior normal           Diagnostic Test Review: The attending physician has personally reviewed the pertinent films in PACS and the interpretation is as follows:    X-Ray of left knee taken on 11/8/23 were reviewed and showed no acute osseous abnormalities or fractures  Procedures   None performed      Scribe " Attestation    I,:  Yary Umana am acting as a scribe while in the presence of the attending physician :       I,:  Jet Joshi, DO personally performed the services described in this documentation    as scribed in my presence :

## 2023-05-25 NOTE — LETTER
May 25, 2023     Rosemarie Lentz PA-C  [address] [facility]    Patient: Jaylyn Patricia   YOB: 1985   Date of Visit: 5/25/2023       Dear Dr Omar Leger: Thank you for referring Jaylyn Patricia to me for evaluation  Below are my notes for this consultation  If you have questions, please do not hesitate to call me  I look forward to following your patient along with you  Sincerely,        Santos Breath, DO        CC: No Recipients    Santos Breath, DO  5/25/2023  9:03 AM  Signed  Assessment/Plan:   Diagnoses and all orders for this visit:    Primary osteoarthritis of left knee  -     Ambulatory Referral to Physical Therapy; Future    Sprain of other ligament of left knee, initial encounter  -     Ambulatory Referral to Orthopedic Surgery         Reviewed physical exam and imaging with patient on today's exam  Her symptoms are consistent with mild osteoarthritis of her left knee  Referred to physical therapy to improve strength and proprioception  A cortisone injection was offered, but the patient declined at this time  She will follow-up in 3 months for re-evaluation following physical therapy  The patient expresses understanding and is in agreement with today's treatment plan  The patient has osteoarthritis of her left knee  There is no specific joint line tenderness  Mild crepitations present  Treatment options were discussed  She was not interested in any injections  She wants to go back to therapy  A new prescription was written  Follow-up 3 months evaluation  If her condition changes, she would not hesitate to let us know    Subjective:   Patient ID: Jaylyn Patricia  1985     HPI  Patient is a 45 y o  female who presents for initial evaluation of her left knee  The patient reported to Urgent Care on 5/23/23 for a sprain of her left knee  The patient also reported to Urgent Care on 11/8/22 for the same injury   She completed 3 physical therapy appointments throughout 2022  On today's visit, she reports pain in her lateral and anterior knee  She states that she has pain with increased activity  The patient states that she works from work and has periods of prolonged sitting, which increases her pain  She states that she enjoyed physical therapy but she did not complete it for a long period of time  She denies numbness, tingling, weakness, feelings of instability, fever, chills, or malaise  The following portions of the patient's history were reviewed and updated as appropriate:  Past medical history, past surgical history, Family history, social history, current medications and allergies    Past Medical History:   Diagnosis Date   • Afib ([facility] [address] )    • Anxiety        Past Surgical History:   Procedure Laterality Date   •  SECTION     • CHOLECYSTECTOMY         History reviewed  No pertinent family history  Social History     Socioeconomic History   • Marital status: Single     Spouse name: None   • Number of children: None   • Years of education: None   • Highest education level: None   Occupational History   • None   Tobacco Use   • Smoking status: Former   • Smokeless tobacco: Never   Vaping Use   • Vaping Use: Every day   • Substances: Flavoring   Substance and Sexual Activity   • Alcohol use: Not Currently   • Drug use: Never   • Sexual activity: None   Other Topics Concern   • None   Social History Narrative   • None     Social Determinants of Health     Financial Resource Strain: Not on file   Food Insecurity: Not on file   Transportation Needs: Not on file   Physical Activity: Not on file   Stress: Not on file   Social Connections: Not on file   Intimate Partner Violence: Not on file   Housing Stability: Not on file       No current outpatient medications on file  No Known Allergies    Review of Systems   Constitutional: Negative for chills, fever and unexpected weight change     HENT: Negative for hearing loss, nosebleeds and "sore throat  Eyes: Negative for pain, redness and visual disturbance  Respiratory: Negative for cough, shortness of breath and wheezing  Cardiovascular: Negative for chest pain, palpitations and leg swelling  Gastrointestinal: Negative for abdominal pain, nausea and vomiting  Endocrine: Negative for polydipsia and polyuria  Genitourinary: Negative for dysuria and hematuria  Skin: Negative for rash and wound  Neurological: Negative for dizziness, numbness and headaches  Psychiatric/Behavioral: Negative for decreased concentration and suicidal ideas  The patient is not nervous/anxious  All other systems reviewed and are negative  Objective:  /71   Pulse 78   Ht 5' 7\" (1 702 m)   Wt 107 kg (235 lb)   BMI 36 81 kg/m²     Ortho Exam  left knee(s) -   Patient ambulates with steady gait pattern  Uses No assistive device  No anatomical deformity  Skin is warm and dry to touch with no signs of erythema, ecchymosis, or infection  No soft tissue swelling or effusion noted  ROM (5° - 115°)   MMT: 5/5 throughout  No tenderness to palpation on exam  Flexor and extensor mechanisms are intact   Knee is stable to varus and valgus stress  - Lachman's  - Anterior Drawer, - Posterior Drawer  - Medial Alex's, - Lateral Alex's  - Pivot Shift  Patella tracks centrally with palpable crepitus  Calf compartments are soft and supple  - Daniel's sign  2+ DP and PT pulses with brisk capillary refill to the toes  Sural, saphenous, tibial, superficial, and deep peroneal motor and sensory distributions intact  Sensation light touch intact distally      Physical Exam  HENT:      Head: Normocephalic and atraumatic  Nose: Nose normal    Eyes:      Conjunctiva/sclera: Conjunctivae normal    Cardiovascular:      Rate and Rhythm: Normal rate  Pulmonary:      Effort: Pulmonary effort is normal    Musculoskeletal:      Cervical back: Neck supple  Skin:     General: Skin is warm and dry        Capillary " Refill: Capillary refill takes less than 2 seconds  Neurological:      Mental Status: She is alert and oriented to person, place, and time  Psychiatric:         Mood and Affect: Mood normal          Behavior: Behavior normal           Diagnostic Test Review: The attending physician has personally reviewed the pertinent films in PACS and the interpretation is as follows:    X-Ray of left knee taken on 11/8/23 were reviewed and showed no acute osseous abnormalities or fractures  Procedures   None performed      Scribe Attestation      I,:  Griselda Penn am acting as a scribe while in the presence of the attending physician :       I,:  Azell Schlatter, DO personally performed the services described in this documentation    as scribed in my presence :

## 2023-05-30 ENCOUNTER — TELEPHONE (OUTPATIENT)
Dept: OBGYN CLINIC | Facility: HOSPITAL | Age: 38
End: 2023-05-30

## 2023-05-30 NOTE — TELEPHONE ENCOUNTER
Caller: Patient    Doctor: Ansley Cruz    Reason for call: Patient is still having pain would like to know if an MRI can be ordered?     Call back#: [phone]

## 2023-06-29 ENCOUNTER — EVALUATION (OUTPATIENT)
Dept: PHYSICAL THERAPY | Facility: CLINIC | Age: 38
End: 2023-06-29
Payer: COMMERCIAL

## 2023-06-29 DIAGNOSIS — M17.12 PRIMARY OSTEOARTHRITIS OF LEFT KNEE: Primary | ICD-10-CM

## 2023-06-29 PROCEDURE — 97535 SELF CARE MNGMENT TRAINING: CPT | Performed by: PHYSICAL THERAPIST

## 2023-06-29 PROCEDURE — 97162 PT EVAL MOD COMPLEX 30 MIN: CPT | Performed by: PHYSICAL THERAPIST

## 2023-06-29 NOTE — PROGRESS NOTES
PT Evaluation     Today's date: 2023  Patient name: Spencer Farris  : 1985  MRN: [id number]  Referring provider: Karol Hall DO  Dx:   Encounter Diagnosis     ICD-10-CM    1  Primary osteoarthritis of left knee  M17 12 Ambulatory Referral to Physical Therapy                     Assessment  Assessment details: Patient is a 45year old female who presents to PT with c/o pain in left knee  PT examination notes left knee weakness, antalgic gait and increased pain due to diagnosis of osteoarthritis  Patient would benefit from PT intervention including exercises for rom, strength and stability, functional training, manual therapy, HEP, balance training, aerobic conditioning and pain relieving modalities in order to maximize functional independence  Impairments: abnormal gait, activity intolerance, impaired balance, impaired physical strength, lacks appropriate home exercise program and pain with function    Goals  ST  Initiate HEP  2  Patient to report decreased pain at worst by 25% in 4 weeks    LT  Patient to report decreased pain by 50% in 8 weeks  2  Patient to increase left knee strength to 5/5 in 8 weeks  3   Patient to improve functional and work ability to [facility] in 8 weeks    Plan  Plan details: Patient agreeable to 1-2 visits per week  Patient would benefit from: PT eval and skilled physical therapy  Planned modality interventions: cryotherapy and thermotherapy: hydrocollator packs  Other planned modality interventions: Modalities PRN   Planned therapy interventions: IADL retraining, ADL retraining, joint mobilization, manual therapy, balance, neuromuscular re-education, patient education, strengthening, stretching, therapeutic activities, therapeutic exercise, therapeutic training, functional ROM exercises, flexibility, graded activity, graded exercise and home exercise program  Frequency: 2x week  Duration in visits: 8  Duration in weeks: 4  Treatment plan discussed with: "patient        Subjective Evaluation    History of Present Illness  Date of onset: 2022  Mechanism of injury: Patient presents to PT with c/o pain in left knee  She was seen in PT last year for same issue  Patient reports the pain began around 22 without known injury  Patient reports her pain has been worsening and she has consistent \"cracking\" in her knee  Patient reports she will get \"throbbing\" at night that will be worse if she is busy and on her feet during the day  Patient was seen by orthopedic last week and she was referred for PT  Patient reports she will get an MRI if symptoms do not improve  Patient uses a knee brace for support as she need  Patient RTD in 3 months and is now referred to oppt  Pain  At best pain ratin  At worst pain ratin  Quality: throbbing  Aggravating factors: stair climbing  Progression: worsening    Patient Goals  Patient goals for therapy: decreased pain          Objective     Neurological Testing     Sensation     Knee   Left Knee   Intact: light touch    Right Knee   Intact: light touch     Active Range of Motion   Left Knee   Normal active range of motion    Right Knee   Normal active range of motion    Mobility   Patellar Mobility:   Left Knee   Hypomobile: left medial, left lateral, left superior and left inferior    Strength/Myotome Testing     Left Knee   Flexion: 4+  Extension: 4+  Quadriceps contraction: good    Right Knee   Normal strength    Tests     Left Knee   Negative anterior Lachman, lateral Alex, medial Alex, posterior Lachman, valgus stress test at 0 degrees and varus stress test at 0 degrees  Ambulation     Observational Gait   Gait: antalgic   Decreased walking speed, stride length, left stance time and left step length                   Precautions None       Manuals        Left LE Stretch                                Neuro Re-Ed                                                                 Ther Ex        [facility]        TR/HR   " Standing SLR 3-way        1/4 Squats        LAQ        Seated HS curls with TB        QS        SAQ        SLR        Heel Slides                Ther Activity                        Gait Training                        Modalities                           Access Code: [email or web address]  URL: [email or web address]  Date: 06/29/2023  Prepared by: Shraddha Powell    Exercises  - Supine Quad Set  - 1 x daily - 7 x weekly - 3 sets - 10 reps  - Seated Long Arc Quad  - 1 x daily - 7 x weekly - 3 sets - 10 reps  - Supine Heel Slide  - 1 x daily - 7 x weekly - 3 sets - 10 reps  - Active Straight Leg Raise with Quad Set  - 1 x daily - 7 x weekly - 3 sets - 10 reps  - Supine Short Arc Quad  - 1 x daily - 7 x weekly - 3 sets - 10 reps

## 2023-07-05 ENCOUNTER — OFFICE VISIT (OUTPATIENT)
Dept: PHYSICAL THERAPY | Facility: CLINIC | Age: 38
End: 2023-07-05
Payer: COMMERCIAL

## 2023-07-05 DIAGNOSIS — M17.12 PRIMARY OSTEOARTHRITIS OF LEFT KNEE: Primary | ICD-10-CM

## 2023-07-05 PROCEDURE — 97530 THERAPEUTIC ACTIVITIES: CPT

## 2023-07-05 PROCEDURE — 97110 THERAPEUTIC EXERCISES: CPT

## 2023-07-05 NOTE — PROGRESS NOTES
Daily Note     Today's date: 2023  Patient name: Jovany Henry  : 1985  MRN: [id number]  Referring provider: Adelina Oh DO  Dx:   Encounter Diagnosis     ICD-10-CM    1. Primary osteoarthritis of left knee  M17.12           Start Time: 0900  Stop Time: 0945  Total time in clinic (min): 45 minutes    Subjective: Patient indicated that her knee is feeling good today. Objective: See treatment diary below      Assessment:  Patient began POC during today's therapy session. Therapeutic exercise program was completed with good technique and no previous pain or symptoms. Continue to recommend PT in order to achieve maximal functional independence. Plan: Continue per plan of care. Precautions None       Manuals       Left LE Stretch                                Neuro Re-Ed                                                                 Ther Ex        [address]  L1 10'      TR/HR  20x      Standing SLR 3-way  10x      1/4 Squats        LAQ  10x      Seated HS curls with TB        QS  20x5"       SAQ        SLR        Heel Slides  2x10 hold 5"               Ther Activity        Step-ups  2x10              Gait Training                        Modalities                          Access Code: [email or web address]  URL: [email or web address]  Date: 2023  Prepared by: Jessie Crutch    Exercises  - Supine Quad Set  - 1 x daily - 7 x weekly - 3 sets - 10 reps  - Seated Long Arc Quad  - 1 x daily - 7 x weekly - 3 sets - 10 reps  - Supine Heel Slide  - 1 x daily - 7 x weekly - 3 sets - 10 reps  - Active Straight Leg Raise with Quad Set  - 1 x daily - 7 x weekly - 3 sets - 10 reps  - Supine Short Arc Quad  - 1 x daily - 7 x weekly - 3 sets - 10 reps

## 2023-07-12 ENCOUNTER — OFFICE VISIT (OUTPATIENT)
Dept: PHYSICAL THERAPY | Facility: CLINIC | Age: 38
End: 2023-07-12
Payer: COMMERCIAL

## 2023-07-12 DIAGNOSIS — M17.12 PRIMARY OSTEOARTHRITIS OF LEFT KNEE: Primary | ICD-10-CM

## 2023-07-12 PROCEDURE — 97110 THERAPEUTIC EXERCISES: CPT

## 2023-07-12 PROCEDURE — 97530 THERAPEUTIC ACTIVITIES: CPT

## 2023-07-12 NOTE — PROGRESS NOTES
Daily Note     Today's date: 2023  Patient name: Sheila Courtney  : 1985  MRN: [id number]  Referring provider: Isaiah Montoya DO  Dx:   Encounter Diagnosis     ICD-10-CM    1. Primary osteoarthritis of left knee  M17.12                      Subjective:  Patient indicated that she was cleaning out a house yesterday so she is tired and exhausted from that. Objective: See treatment diary below        Assessment:  Focused on decreasing pain and discomfort in L knee and shoulder, began therapy session with P. Performed [facility] and LAQ but modified session due to pain and fatigiue. Continue to recommend PT in order to achieve maximal functional independence. Plan: Continue per plan of care. Precautions None       Manuals      Left LE Stretch                                Neuro Re-Ed                                                                 Ther Ex        [facility]  L1 10' L1 10'     TR/HR  20x      Standing SLR 3-way  10x      1/4 Squats        LAQ  10x 10x     Seated HS curls with TB        QS  20x5"       SAQ        SLR        Heel Slides  2x10 hold 5"               Ther Activity        Step-ups  2x10              Gait Training                        Modalities        [facility]   15' L knee and shoulder               Access Code: [email or web address]  URL: [email or web address]  Date: 2023  Prepared by: Mary Alice Ballard    Exercises  - Supine Quad Set  - 1 x daily - 7 x weekly - 3 sets - 10 reps  - Seated Long Arc Quad  - 1 x daily - 7 x weekly - 3 sets - 10 reps  - Supine Heel Slide  - 1 x daily - 7 x weekly - 3 sets - 10 reps  - Active Straight Leg Raise with Quad Set  - 1 x daily - 7 x weekly - 3 sets - 10 reps  - Supine Short Arc Quad  - 1 x daily - 7 x weekly - 3 sets - 10 reps

## 2023-07-19 ENCOUNTER — APPOINTMENT (OUTPATIENT)
Dept: PHYSICAL THERAPY | Facility: CLINIC | Age: 38
End: 2023-07-19
Payer: COMMERCIAL

## 2023-09-15 DIAGNOSIS — I48.0 PAROXYSMAL ATRIAL FIBRILLATION (HCC): ICD-10-CM

## 2023-09-15 DIAGNOSIS — R00.2 PALPITATIONS: ICD-10-CM

## 2023-10-04 ENCOUNTER — EVALUATION (OUTPATIENT)
Dept: PHYSICAL THERAPY | Facility: CLINIC | Age: 38
End: 2023-10-04
Payer: COMMERCIAL

## 2023-10-04 DIAGNOSIS — S83.8X2A SPRAIN OF OTHER LIGAMENT OF LEFT KNEE, INITIAL ENCOUNTER: Primary | ICD-10-CM

## 2023-10-04 PROCEDURE — 97162 PT EVAL MOD COMPLEX 30 MIN: CPT | Performed by: PHYSICAL THERAPIST

## 2023-10-04 NOTE — PROGRESS NOTES
PT Evaluation     Today's date: 10/4/2023  Patient name: Latisha Franco  : 1985  MRN: [id number]  Referring provider: Lex Oliveros DO  Dx:   Encounter Diagnosis     ICD-10-CM    1. Sprain of other ligament of left knee, initial encounter  S83.8X2A Ambulatory Referral to Physical Therapy                     Assessment  Assessment details: Patient is a 45year old female who presents to PT with c/o pain in left knee and shoulder. Findings on PT examination includes ACL laxity, decreased weakness and stability in left knee causing increased pain and decreased activity tolerance. Patient would benefit from PT intervention including exercises for rom, strength and stability, functional training, manual therapy, HEP, balance training, aerobic conditioning and pain relieving modalities in order to maximize functional independence and mobility. Impairments: abnormal gait, activity intolerance, impaired balance, impaired physical strength, lacks appropriate home exercise program and pain with function    Goals  ST. Initiate HEP  2. Patient to report decreased pain at worst by 50% in 4 weeks    LT. Patient to report decreased pain at worst by % in 8 weeks  2. Patient to increase left knee strength to 5/5 in 8 weeks  3.  Patient to normalize balance and gait in 8 weeks    Plan  Patient would benefit from: PT eval and skilled physical therapy  Planned modality interventions: cryotherapy, thermotherapy: hydrocollator packs, ultrasound and unattended electrical stimulation  Other planned modality interventions: Modalities PRN  Planned therapy interventions: IASTM, joint mobilization, ADL retraining, manual therapy, balance, neuromuscular re-education, patient education, strengthening, stretching, therapeutic activities, therapeutic exercise, therapeutic training, graded activity, functional ROM exercises, flexibility, graded exercise and home exercise program  Frequency: 2x week  Duration in visits: 8  Duration in weeks: 4  Treatment plan discussed with: patient        Subjective Evaluation    History of Present Illness  Date of surgery: 2022  Mechanism of injury: Patient presents to PT with c/o pain in left knee and left shoulder. Patient reports her left knee began hurting last summer. She has had PT in the past with some improvement. Patient reports over the summer it had improved a little however the pain has worsened now that she is working more. Patient reports she will feel some pain and crepitus in her knee when she is working. At night she will get throbbing in her knee when she is relaxing. Patient will occasionally use a knee brace for support. Patient is now referred to oppt. Patient Goals  Patient goals for therapy: decreased pain    Pain  At best pain ratin  At worst pain ratin  Quality: throbbing  Relieving factors: heat  Aggravating factors: walking, standing and stair climbing          Objective     Neurological Testing     Sensation     Knee   Left Knee   Intact: light touch    Right Knee   Intact: light touch     Active Range of Motion   Left Knee   Normal active range of motion    Right Knee   Normal active range of motion    Mobility   Patellar Mobility:   Left Knee   WFL: medial.     Right Knee   WFL: medial    Strength/Myotome Testing     Left Knee   Flexion: 4+  Extension: 4+  Quadriceps contraction: fair    Right Knee   Normal strength    Tests     Left Knee   Positive anterior drawer and anterior Lachman. Negative lateral Alex, medial Alex, posterior drawer, posterior Lachman, valgus stress test at 0 degrees and varus stress test at 0 degrees. Additional Tests Details  Mild laxity noted with ACL testing in left knee    Ambulation     Observational Gait   Gait: antalgic   Decreased walking speed, stride length, left stance time and left step length.                  Precautions None       Manuals 10/                                       Neuro Re-Ed Ther Ex        [facility]        TR/HR        Standing SLR 3-way        Standing HS curls        1/4 squats        QS        SLR        SAQ        Heel Slides                                Ther Activity                        Gait Training                        Modalities        IFC with CP/MHP Prn

## 2023-10-04 NOTE — LETTER
2023    Orestes Leos DO  [address] [facility],[address] [facility]    Patient: Ana Moreno   YOB: 1985   Date of Visit: 10/4/2023     Encounter Diagnosis     ICD-10-CM    1. Sprain of other ligament of left knee, initial encounter  R57.8R8E Ambulatory Referral to Physical Therapy          Dear Dr. Mon Lipoma: Thank you for your recent referral of Ana Moreno. Please review the attached evaluation summary from [place] recent visit. Please verify that you agree with the plan of care by signing the attached order. If you have any questions or concerns, please do not hesitate to call. I sincerely appreciate the opportunity to share in the care of one of your patients and hope to have another opportunity to work with you in the near future. Sincerely,    Kiah Harp, PT      Referring Provider:      I certify that I have read the below Plan of Care and certify the need for these services furnished under this plan of treatment while under my care. Orestes Leos DO  [address] [address]  Via Fax: [phone]          PT Evaluation     Today's date: 10/4/2023  Patient name: Ana Moreno  : 1985  MRN: [id number]  Referring provider: Orestes Leos DO  Dx:   Encounter Diagnosis     ICD-10-CM    1. Sprain of other ligament of left knee, initial encounter  S83.8X2A Ambulatory Referral to Physical Therapy                     Assessment  Assessment details: Patient is a 45year old female who presents to PT with c/o pain in left knee and shoulder. Findings on PT examination includes ACL laxity, decreased weakness and stability in left knee causing increased pain and decreased activity tolerance.  Patient would benefit from PT intervention including exercises for rom, strength and stability, functional training, manual therapy, HEP, balance training, aerobic conditioning and pain relieving modalities in order to maximize functional independence and mobility. Impairments: abnormal gait, activity intolerance, impaired balance, impaired physical strength, lacks appropriate home exercise program and pain with function    Goals  ST. Initiate HEP  2. Patient to report decreased pain at worst by 50% in 4 weeks    LT. Patient to report decreased pain at worst by % in 8 weeks  2. Patient to increase left knee strength to 5/5 in 8 weeks  3. Patient to normalize balance and gait in 8 weeks    Plan  Patient would benefit from: PT eval and skilled physical therapy  Planned modality interventions: cryotherapy, thermotherapy: hydrocollator packs, ultrasound and unattended electrical stimulation  Other planned modality interventions: Modalities PRN  Planned therapy interventions: IASTM, joint mobilization, ADL retraining, manual therapy, balance, neuromuscular re-education, patient education, strengthening, stretching, therapeutic activities, therapeutic exercise, therapeutic training, graded activity, functional ROM exercises, flexibility, graded exercise and home exercise program  Frequency: 2x week  Duration in visits: 8  Duration in weeks: 4  Treatment plan discussed with: patient        Subjective Evaluation    History of Present Illness  Date of surgery: 2022  Mechanism of injury: Patient presents to PT with c/o pain in left knee and left shoulder. Patient reports her left knee began hurting last summer. She has had PT in the past with some improvement. Patient reports over the summer it had improved a little however the pain has worsened now that she is working more. Patient reports she will feel some pain and crepitus in her knee when she is working. At night she will get throbbing in her knee when she is relaxing. Patient will occasionally use a knee brace for support. Patient is now referred to oppt.    Patient Goals  Patient goals for therapy: decreased pain    Pain  At best pain ratin  At worst pain ratin  Quality: throbbing  Relieving factors: heat  Aggravating factors: walking, standing and stair climbing          Objective     Neurological Testing     Sensation     Knee   Left Knee   Intact: light touch    Right Knee   Intact: light touch     Active Range of Motion   Left Knee   Normal active range of motion    Right Knee   Normal active range of motion    Mobility   Patellar Mobility:   Left Knee   WFL: medial.     Right Knee   WFL: medial    Strength/Myotome Testing     Left Knee   Flexion: 4+  Extension: 4+  Quadriceps contraction: fair    Right Knee   Normal strength    Tests     Left Knee   Positive anterior drawer and anterior Lachman. Negative lateral Alex, medial Alex, posterior drawer, posterior Lachman, valgus stress test at 0 degrees and varus stress test at 0 degrees. Additional Tests Details  Mild laxity noted with ACL testing in left knee    Ambulation     Observational Gait   Gait: antalgic   Decreased walking speed, stride length, left stance time and left step length.                Precautions None       Manuals 10/4                                       Neuro Re-Ed                                                                 Ther Ex        [address]        TR/HR        Standing SLR 3-way        Standing HS curls        1/4 squats        QS        SLR        SAQ        Heel Slides                                Ther Activity                        Gait Training                        Modalities        IFC with CP/MHP Prn

## 2023-10-09 ENCOUNTER — OFFICE VISIT (OUTPATIENT)
Dept: PHYSICAL THERAPY | Facility: CLINIC | Age: 38
End: 2023-10-09
Payer: COMMERCIAL

## 2023-10-09 DIAGNOSIS — S83.8X2A SPRAIN OF OTHER LIGAMENT OF LEFT KNEE, INITIAL ENCOUNTER: Primary | ICD-10-CM

## 2023-10-09 PROCEDURE — 97110 THERAPEUTIC EXERCISES: CPT

## 2023-10-09 NOTE — PROGRESS NOTES
Daily Note     Today's date: 10/9/2023  Patient name: Nataliia Dexter  : 1985  MRN: [id number]  Referring provider: Miranda Bethea DO  Dx:   Encounter Diagnosis     ICD-10-CM    1. Sprain of other ligament of left knee, initial encounter  A33.2H0A                      Subjective: Patient reports having left knee pain today. Objective: See treatment diary below      Assessment: Tolerated treatment well. Patient demonstrated difficulty with supine SLR, had difficulty sustaining QS with SLR secondary to weakness. Plan: Continue per plan of care.         Precautions None       Manuals 10/4 10/9                                      Neuro Re-Ed                                                                 Ther Ex        SRC  l2 10 min      TR/HR  2x10      Standing SLR 3-way  2x10 B each      Standing HS curls  2x10 B      / squats  2x10      QS  2x10 B      SLR  2x10 B      SAQ  2x10 B      Heel Slides                                Ther Activity                        Gait Training                        Modalities        IFC with CP/MHP Prn

## 2023-10-11 ENCOUNTER — OFFICE VISIT (OUTPATIENT)
Dept: PHYSICAL THERAPY | Facility: CLINIC | Age: 38
End: 2023-10-11
Payer: COMMERCIAL

## 2023-10-11 DIAGNOSIS — S83.8X2A SPRAIN OF OTHER LIGAMENT OF LEFT KNEE, INITIAL ENCOUNTER: Primary | ICD-10-CM

## 2023-10-11 PROCEDURE — 97110 THERAPEUTIC EXERCISES: CPT | Performed by: PHYSICAL THERAPIST

## 2023-10-11 NOTE — PROGRESS NOTES
Daily Note     Today's date: 10/11/2023  Patient name: Toby Valdes  : 1985  MRN: [id number]  Referring provider: Maxim Perry DO  Dx:   Encounter Diagnosis     ICD-10-CM    1. Sprain of other ligament of left knee, initial encounter  E68.1I3L                      Subjective: Patient reports pain in her left hip when standing on that leg and with SLR. Objective: See treatment diary below      Assessment: Tolerated treatment well. Patient exhibited good technique with therapeutic exercises. Strength slowly improving t/o both LE. Plan: Continue per plan of care.       Precautions None       Manuals 10/4 10/9 10/11                                     Neuro Re-Ed                                                                 Ther Ex        SRC  l2 10 min L2 15 min     TR/HR  2x10 3x10     Standing SLR 3-way  2x10 B each 2x10 B/L, each     Standing HS curls  2x10 B 2x10 B/L     /4 squats  2x10 Held     QS  2x10 B NV     SLR  2x10 B 2x10 B/L     SAQ  2x10 B 2x10 B/L     Heel Slides                                Ther Activity                        Gait Training                        Modalities        IFC with CP/MHP Prn

## 2023-10-18 ENCOUNTER — OFFICE VISIT (OUTPATIENT)
Dept: PHYSICAL THERAPY | Facility: CLINIC | Age: 38
End: 2023-10-18
Payer: COMMERCIAL

## 2023-10-18 DIAGNOSIS — S83.8X2A SPRAIN OF OTHER LIGAMENT OF LEFT KNEE, INITIAL ENCOUNTER: Primary | ICD-10-CM

## 2023-10-18 PROCEDURE — 97110 THERAPEUTIC EXERCISES: CPT | Performed by: PHYSICAL THERAPIST

## 2023-10-18 NOTE — PROGRESS NOTES
Daily Note     Today's date: 10/18/2023  Patient name: Greta Swartz  : 1985  MRN: [id number]  Referring provider: John Recinos DO  Dx:   Encounter Diagnosis     ICD-10-CM    1. Sprain of other ligament of left knee, initial encounter  Q55.4F5E                      Subjective: Patient continues to report increased pain in left hip and knee. Objective: See treatment diary below      Assessment: Tolerated treatment fair. Patient exhibited good technique with therapeutic exercises. Patient with increased discomfort when weight bearing on left LE. Plan: Continue per plan of care.       Precautions None       Manuals 10/4 10/9 10/11 10/18                                    Neuro Re-Ed                                                                 Ther Ex        SRC  l2 10 min L2 15 min L2 10 min    TR/HR  2x10 3x10 3x10    Standing SLR 3-way  2x10 B each 2x10 B/L, each 2x10 Flex    Standing HS curls  2x10 B 2x10 B/L NT    /4 squats  2x10 Held Held    QS  2x10 B NV 2x10 3" B/L    SLR  2x10 B 2x10 B/L     SAQ  2x10 B 2x10 B/L 2x10 B/L    Heel Slides                                Ther Activity                        Gait Training                        Modalities        IFC with CP/MHP Prn        MHP Prn    To left knee and hip 10 min

## 2023-10-23 ENCOUNTER — APPOINTMENT (OUTPATIENT)
Dept: PHYSICAL THERAPY | Facility: CLINIC | Age: 38
End: 2023-10-23
Payer: COMMERCIAL

## 2023-10-24 ENCOUNTER — HOSPITAL ENCOUNTER (OUTPATIENT)
Dept: NON INVASIVE DIAGNOSTICS | Facility: HOSPITAL | Age: 38
Discharge: HOME | End: 2023-10-24
Attending: INTERNAL MEDICINE
Payer: COMMERCIAL

## 2023-10-24 VITALS
SYSTOLIC BLOOD PRESSURE: 105 MMHG | WEIGHT: 235.89 LBS | HEART RATE: 78 BPM | DIASTOLIC BLOOD PRESSURE: 71 MMHG | HEIGHT: 67 IN | BODY MASS INDEX: 37.02 KG/M2

## 2023-10-24 DIAGNOSIS — R00.2 PALPITATIONS: ICD-10-CM

## 2023-10-24 DIAGNOSIS — I48.0 PAROXYSMAL ATRIAL FIBRILLATION (HCC): ICD-10-CM

## 2023-10-24 LAB
AORTIC ROOT: 3.5 CM
AORTIC VALVE MEAN VELOCITY: 7.5 M/S
APICAL FOUR CHAMBER EJECTION FRACTION: 51 %
ASCENDING AORTA: 2.6 CM
AV AREA BY CONTINUOUS VTI: 3.2 CM2
AV AREA PEAK VELOCITY: 2.7 CM2
AV LVOT MEAN GRADIENT: 2 MMHG
AV LVOT PEAK GRADIENT: 3 MMHG
AV MEAN GRADIENT: 3 MMHG
AV PEAK GRADIENT: 5 MMHG
AV VALVE AREA: 3.17 CM2
AV VELOCITY RATIO: 0.78
DOP CALC AO PEAK VEL: 1.14 M/S
DOP CALC AO VTI: 22.94 CM
DOP CALC LVOT AREA: 3.46 CM2
DOP CALC LVOT CARDIAC INDEX: 2.04 L/MIN/M2
DOP CALC LVOT CARDIAC OUTPUT: 4.43 L/MIN
DOP CALC LVOT DIAMETER: 2.1 CM
DOP CALC LVOT PEAK VEL VTI: 21.02 CM
DOP CALC LVOT PEAK VEL: 0.89 M/S
DOP CALC LVOT STROKE INDEX: 33.6 ML/M2
DOP CALC LVOT STROKE VOLUME: 72.77
E WAVE DECELERATION TIME: 245 MS
E/A RATIO: 1.84
FRACTIONAL SHORTENING: 35 (ref 28–44)
INTERVENTRICULAR SEPTUM IN DIASTOLE (PARASTERNAL SHORT AXIS VIEW): 1 CM
INTERVENTRICULAR SEPTUM: 1 CM (ref 0.6–1.1)
LAAS-AP2: 20.2 CM2
LAAS-AP4: 12.3 CM2
LEFT ATRIUM SIZE: 3.2 CM
LEFT ATRIUM VOLUME (MOD BIPLANE): 42 ML
LEFT ATRIUM VOLUME INDEX (MOD BIPLANE): 19.4 ML/M2
LEFT INTERNAL DIMENSION IN SYSTOLE: 3.2 CM (ref 2.1–4)
LEFT VENTRICLE DIASTOLIC VOLUME (MOD BIPLANE): 86 ML
LEFT VENTRICLE SYSTOLIC VOLUME (MOD BIPLANE): 38 ML
LEFT VENTRICULAR INTERNAL DIMENSION IN DIASTOLE: 4.9 CM (ref 3.5–6)
LEFT VENTRICULAR POSTERIOR WALL IN END DIASTOLE: 1.1 CM
LEFT VENTRICULAR STROKE VOLUME: 72 ML
LV EF: 55 %
LVSV (TEICH): 72 ML
MV E'TISSUE VEL-LAT: 14 CM/S
MV E'TISSUE VEL-SEP: 13 CM/S
MV PEAK A VEL: 0.43 M/S
MV PEAK E VEL: 79 CM/S
MV STENOSIS PRESSURE HALF TIME: 71 MS
MV VALVE AREA P 1/2 METHOD: 3.1
PV PEAK GRADIENT: 3 MMHG
RIGHT ATRIUM AREA SYSTOLE A4C: 18.6 CM2
SL CV LEFT ATRIUM LENGTH A2C: 5.9 CM
SL CV LV EF: 55
SL CV PED ECHO LEFT VENTRICLE DIASTOLIC VOLUME (MOD BIPLANE) 2D: 113 ML
SL CV PED ECHO LEFT VENTRICLE SYSTOLIC VOLUME (MOD BIPLANE) 2D: 42 ML
TR MAX PG: 17 MMHG
TR PEAK VELOCITY: 2 M/S
TRICUSPID ANNULAR PLANE SYSTOLIC EXCURSION: 2.8 CM
TRICUSPID VALVE PEAK REGURGITATION VELOCITY: 2.03 M/S

## 2023-10-24 PROCEDURE — 93306 TTE W/DOPPLER COMPLETE: CPT

## 2023-10-25 ENCOUNTER — APPOINTMENT (OUTPATIENT)
Dept: PHYSICAL THERAPY | Facility: CLINIC | Age: 38
End: 2023-10-25
Payer: COMMERCIAL

## 2023-10-30 ENCOUNTER — APPOINTMENT (OUTPATIENT)
Dept: PHYSICAL THERAPY | Facility: CLINIC | Age: 38
End: 2023-10-30
Payer: COMMERCIAL

## 2024-01-03 ENCOUNTER — APPOINTMENT (OUTPATIENT)
Dept: RADIOLOGY | Facility: CLINIC | Age: 39
End: 2024-01-03
Payer: COMMERCIAL

## 2024-01-03 ENCOUNTER — OFFICE VISIT (OUTPATIENT)
Dept: URGENT CARE | Facility: CLINIC | Age: 39
End: 2024-01-03
Payer: COMMERCIAL

## 2024-01-03 VITALS
TEMPERATURE: 98 F | SYSTOLIC BLOOD PRESSURE: 106 MMHG | RESPIRATION RATE: 20 BRPM | DIASTOLIC BLOOD PRESSURE: 59 MMHG | OXYGEN SATURATION: 98 % | HEART RATE: 66 BPM

## 2024-01-03 DIAGNOSIS — M25.472 LEFT ANKLE SWELLING: ICD-10-CM

## 2024-01-03 DIAGNOSIS — M25.472 LEFT ANKLE SWELLING: Primary | ICD-10-CM

## 2024-01-03 PROCEDURE — 99213 OFFICE O/P EST LOW 20 MIN: CPT

## 2024-01-03 PROCEDURE — 73630 X-RAY EXAM OF FOOT: CPT

## 2024-01-03 PROCEDURE — 73610 X-RAY EXAM OF ANKLE: CPT

## 2024-01-03 RX ORDER — NAPROXEN 500 MG/1
500 TABLET ORAL 2 TIMES DAILY WITH MEALS
Qty: 14 TABLET | Refills: 0 | Status: SHIPPED | OUTPATIENT
Start: 2024-01-03

## 2024-01-03 NOTE — PATIENT INSTRUCTIONS
Please follow with your PCP in regard to this chronic issue.  Take naproxen with food twice daily to help with pain and swelling.

## 2024-01-03 NOTE — PROGRESS NOTES
[facility]        NAME: Leonila Burden is a 38 y.o. female  : 1985    MRN: [id number]  DATE: January 3, 2024  TIME: 1:13 PM    Assessment and Plan   Left ankle swelling [M25.472]  1. Left ankle swelling  XR ankle 3+ vw left    XR foot 3+ vw left    naproxen (Naprosyn) 500 mg tablet        No acute findings on xray. Consistent with arthritis flare. Naproxen for pain and inflammation. Recommend f/u with PCP.  Will follow with official read and notify patient.    Patient Instructions   Please follow with your PCP in regard to this chronic issue.  Take naproxen with food twice daily to help with pain and swelling.    Follow up with PCP in 3-5 days.  Proceed to  ER if symptoms worsen.    Chief Complaint     Chief Complaint   Patient presents with    left foot pain and swelling         History of Present Illness       Patient is a 38 year old female who presents to the office today for left ankle swelling. States that it has been ongoing off and on for months. Notes that it is good for about 3 months and sometimes not. Notes that this episode started on lai day. Also has swelling of top of left foot. Never had swelling on right side. Notes pain radiating up the foot with pressure on bottom of foot. Has not tried taking anything.         Review of Systems   Review of Systems   Constitutional:  Negative for appetite change, chills and fever.   Musculoskeletal:  Positive for arthralgias, gait problem and joint swelling.   All other systems reviewed and are negative.        Current Medications       Current Outpatient Medications:     naproxen (Naprosyn) 500 mg tablet, Take 1 tablet (500 mg total) by mouth 2 (two) times a day with meals, Disp: 14 tablet, Rfl: 0    Current Allergies     Allergies as of 2024    (No Known Allergies)            The following portions of the patient's history were reviewed and updated as appropriate: allergies, current medications, past family history, past  medical history, past social history, past surgical history and problem list.     Past Medical History:   Diagnosis Date    Afib (HCC)     Anxiety        Past Surgical History:   Procedure Laterality Date     SECTION      CHOLECYSTECTOMY         History reviewed. No pertinent family history.      Medications have been verified.        Objective   /59   Pulse 66   Temp 98 °F (36.7 °C)   Resp 20   SpO2 98%        Physical Exam     Physical Exam  Vitals and nursing note reviewed.   Constitutional:       Appearance: Normal appearance. She is normal weight.   Cardiovascular:      Rate and Rhythm: Normal rate.      Pulses: Normal pulses.   Pulmonary:      Effort: Pulmonary effort is normal.   Musculoskeletal:         General: Swelling and tenderness present. No deformity or signs of injury.      Right lower leg: No edema.      Left lower leg: No edema.      Left ankle: Swelling present. No tenderness. Normal range of motion. Anterior drawer test negative. Normal pulse.        Feet:    Skin:     General: Skin is warm.      Capillary Refill: Capillary refill takes less than 2 seconds.   Neurological:      Mental Status: She is alert.

## 2024-01-08 RX ORDER — NICOTINE 21 MG/24HR
1 PATCH, TRANSDERMAL 24 HOURS TRANSDERMAL EVERY 24 HOURS
COMMUNITY
Start: 2023-09-06

## 2024-01-08 RX ORDER — METOPROLOL SUCCINATE 25 MG/1
25 TABLET, EXTENDED RELEASE ORAL DAILY
COMMUNITY
Start: 2023-12-07

## 2024-01-09 ENCOUNTER — OFFICE VISIT (OUTPATIENT)
Dept: OBGYN CLINIC | Facility: CLINIC | Age: 39
End: 2024-01-09
Payer: COMMERCIAL

## 2024-01-09 VITALS
BODY MASS INDEX: 37.61 KG/M2 | WEIGHT: 239.6 LBS | HEIGHT: 67 IN | SYSTOLIC BLOOD PRESSURE: 105 MMHG | HEART RATE: 71 BPM | DIASTOLIC BLOOD PRESSURE: 70 MMHG

## 2024-01-09 DIAGNOSIS — M25.572 PAIN OF JOINT OF LEFT ANKLE AND FOOT: Primary | ICD-10-CM

## 2024-01-09 PROCEDURE — 99244 OFF/OP CNSLTJ NEW/EST MOD 40: CPT | Performed by: STUDENT IN AN ORGANIZED HEALTH CARE EDUCATION/TRAINING PROGRAM

## 2024-01-09 NOTE — PROGRESS NOTES
Orthopedic Surgery Office Note  Leonila Burden (38 y.o. female)   : 1985   MRN: [id number]   Encounter Date: 2024 with Dr. Boogie Lane DO  Chief Complaint   Patient presents with    Left Ankle - Pain    Left Foot - Pain       Assessment, Plan, & Discussion:     Left Foot Pain  Discussed with the patient that:  - there is bilateral edema in the lower extremities, significantly worse in the left foot  - pitting edema in bilateral lower extremity  - she has attemped ice, elevation, NSAIDs without relief  - possibility of ligamentous damage versus underlying systemic medical condition such as inflammatory synovitis, or another lesion unable to be seen on XR  - MRI ordered at time of visit  - Patient instructed to schedule follow-up after MRI is completed    Plan:   Return for After MRI.    Surgery:   No surgery planned at this time      Orders:     Diagnoses and all orders for this visit:    Pain of joint of left ankle and foot  -     MRI ankle/heel left  wo contrast; Future    Other orders  -     metoprolol succinate (TOPROL-XL) 25 mg 24 hr tablet; Take 25 mg by mouth daily (Patient not taking: Reported on 2024)  -     nicotine (NICODERM CQ) 14 mg/24hr TD 24 hr patch; Place 1 patch on the skin every 24 hours (Patient not taking: Reported on 2024)  -     nicotine polacrilex (COMMIT) 4 MG lozenge; Use one lozenge every 2 to 3 hours as needed for a nicotine craving. (Patient not taking: Reported on 2024)         History of Present Illness:     Leonila Burden is a 38 y.o. female who presents for consultation following visit to the [facility] for orthopedic follow up regarding swelling of the left foot. Patient reports that she noticed the swelling in both feet but that went away. She states that now she is only experiencing the swelling in her left foot as of 1/3-2024. Patient has taken Naproxen at night that does not help with the swelling. She has tried heating pads that help  "with the pain at night. She has tried elevation for multiple hours since the onset of swelling that does not decrease the swelling.    Review of Systems  Constitutional: Negative for fatigue, fever or loss of appetite.   HENT: Negative.    Respiratory: Negative for shortness of breath, dyspnea.    Cardiovascular: Negative for chest pain/tightness.   Gastrointestinal: Negative for abdominal pain, N/V.   Endocrine: Negative for cold/heat intolerance, unexplained weight loss/gain.   Genitourinary: Negative for flank pain, dysuria  Skin: Negative for rash.    Psychiatric/Behavioral: Negative for agitation.  All else negative unless otherwise noted in HPI    Physical Exam:   General:  /70   Pulse 71   Ht 5' 7\" (1.702 m)   Wt 109 kg (239 lb 9.6 oz)   BMI 37.53 kg/m²   Cons: Appears well.  No apparent distress.  Psych: Alert. Oriented x3.  Mood and affect normal.  Eyes: PERRLA, EOMI  Resp: Normal effort.  No audible wheezing or stridor.  CV: Extremities warm and well perfused.   Abd:    No distention or guarding.   Skin: Warm. No visible lesions.  Neuro: Normal muscle tone.      Orthopedic Exam:   left ankle -   Patient ambulates with steady gait pattern  Uses No assistive device  No anatomical deformity  Skin is warm and dry to touch with no signs of erythema, ecchymosis, infection  No soft tissue swelling or effusion noted  ROM WNL in PF, DF, INV. Limited in PROM for EV  TTP over medial malleolus,   MMT: 4/5 throughout  No anterior drawer  - syndesmotic squeeze  Calf compartments are soft and supple  2+ TP and DP pulses with brisk capillary refill to the toes  Sural, saphenous, tibial, superficial, and deep peroneal motor and sensory distributions intact  Sensation to light touch intact distally        Imaging/Studies:     Study: XR left foot  Date: 1/9/2024  Report: No radiologist report was available at this time.   I have personally reviewed imaging and my impression is as follows:   FINDINGS:  There is no " acute fracture or dislocation.     No significant degenerative changes.     No lytic or blastic osseous lesion.     Soft tissues are unremarkable.     IMPRESSION:  No acute osseous abnormality.      Study: VAS Duplex US Lower Extremity - Left Leg  Date: 2022  Report: I have read and agree with the radiologist report. My impression is as follows:  IMPRESSION:  No evidence of deep vein thrombosis in the left leg    Procedures  No procedures today.      Medical, Surgical, Family, and Social History    The patient's medical history, family history, and social history, were reviewed and updated as appropriate.    Past Medical History:   Diagnosis Date    Afib (HCC)     Anxiety      Past Surgical History:   Procedure Laterality Date     SECTION      CHOLECYSTECTOMY       History reviewed. No pertinent family history.  Social History     Occupational History    Not on file   Tobacco Use    Smoking status: Former     Current packs/day: 0.00     Types: Cigarettes     Quit date: 2024     Years since quittin.0    Smokeless tobacco: Never   Vaping Use    Vaping status: Every Day    Substances: Flavoring   Substance and Sexual Activity    Alcohol use: Not Currently    Drug use: Never    Sexual activity: Not on file     No Known Allergies    Current Outpatient Medications:     naproxen (Naprosyn) 500 mg tablet, Take 1 tablet (500 mg total) by mouth 2 (two) times a day with meals, Disp: 14 tablet, Rfl: 0    metoprolol succinate (TOPROL-XL) 25 mg 24 hr tablet, Take 25 mg by mouth daily (Patient not taking: Reported on 2024), Disp: , Rfl:     nicotine (NICODERM CQ) 14 mg/24hr TD 24 hr patch, Place 1 patch on the skin every 24 hours (Patient not taking: Reported on 2024), Disp: , Rfl:     nicotine polacrilex (COMMIT) 4 MG lozenge, Use one lozenge every 2 to 3 hours as needed for a nicotine craving. (Patient not taking: Reported on 2024), Disp: , Rfl:       This Visit:     30 minutes was spent in the  coordination of care, reviewing of imaging and with the patient on the date of service        Scribe Attestation      I,:  Crissy Sanchezeson am acting as a scribe while in the presence of the attending physician.:       I,:  Boogie Lane DO personally performed the services described in this documentation    as scribed in my presence.:             Dr. Boogie Lane DO, Orthopedic Surgeon  Orthopedic Oncology [facility]

## 2024-01-09 NOTE — LETTER
2024     Marc Ibarra DO  [address] [facility] [id number]    Patient: Leonila Burden   YOB: 1985   Date of Visit: 2024       Dear Dr. Ibarra:    Thank you for referring Leonila Burden to me for evaluation. Below are my notes for this consultation.    If you have questions, please do not hesitate to call me. I look forward to following your patient along with you.         Sincerely,        Boogie Lane DO        CC: No Recipients    Boogie Lane DO  2024  2:36 PM  Signed  Orthopedic Surgery Office Note  Leonila Burden (38 y.o. female)   : 1985   MRN: [id number]   Encounter Date: 2024 with Dr. Boogie Lane DO  Chief Complaint   Patient presents with   • Left Ankle - Pain   • Left Foot - Pain       Assessment, Plan, & Discussion:     Left Foot Pain  Discussed with the patient that:  - there is bilateral edema in the lower extremities, significantly worse in the left foot  - pitting edema in bilateral lower extremity  - she has attemped ice, elevation, NSAIDs without relief  - possibility of ligamentous damage versus underlying systemic medical condition such as inflammatory synovitis, or another lesion unable to be seen on XR  - MRI ordered at time of visit  - Patient instructed to schedule follow-up after MRI is completed    Plan:   Return for After MRI.    Surgery:   No surgery planned at this time      Orders:     Diagnoses and all orders for this visit:    Pain of joint of left ankle and foot  -     MRI ankle/heel left  wo contrast; Future    Other orders  -     metoprolol succinate (TOPROL-XL) 25 mg 24 hr tablet; Take 25 mg by mouth daily (Patient not taking: Reported on 2024)  -     nicotine (NICODERM CQ) 14 mg/24hr TD 24 hr patch; Place 1 patch on the skin every 24 hours (Patient not taking: Reported on 2024)  -     nicotine polacrilex (COMMIT) 4 MG lozenge; Use one lozenge every 2 to 3 hours as needed for a nicotine craving.  "(Patient not taking: Reported on 1/9/2024)         History of Present Illness:     Leonila Burden is a 38 y.o. female who presents for consultation following visit to the [facility], here for orthopedic follow up regarding swelling of the left foot. Patient reports that she noticed the swelling in both feet but that went away. She states that now she is only experiencing the swelling in her left foot as of 1/3-1/4/2024. Patient has taken Naproxen at night that does not help with the swelling. She has tried heating pads that help with the pain at night. She has tried elevation for multiple hours since the onset of swelling that does not decrease the swelling.    Review of Systems  Constitutional: Negative for fatigue, fever or loss of appetite.   HENT: Negative.    Respiratory: Negative for shortness of breath, dyspnea.    Cardiovascular: Negative for chest pain/tightness.   Gastrointestinal: Negative for abdominal pain, N/V.   Endocrine: Negative for cold/heat intolerance, unexplained weight loss/gain.   Genitourinary: Negative for flank pain, dysuria  Skin: Negative for rash.    Psychiatric/Behavioral: Negative for agitation.  All else negative unless otherwise noted in HPI    Physical Exam:   General:  /70   Pulse 71   Ht 5' 7\" (1.702 m)   Wt 109 kg (239 lb 9.6 oz)   BMI 37.53 kg/m²   Cons: Appears well.  No apparent distress.  Psych: Alert. Oriented x3.  Mood and affect normal.  Eyes: PERRLA, EOMI  Resp: Normal effort.  No audible wheezing or stridor.  CV: Extremities warm and well perfused.   Abd:    No distention or guarding.   Skin: Warm. No visible lesions.  Neuro: Normal muscle tone.      Orthopedic Exam:   left ankle -   Patient ambulates with steady gait pattern  Uses No assistive device  No anatomical deformity  Skin is warm and dry to touch with no signs of erythema, ecchymosis, infection  No soft tissue swelling or effusion noted  ROM WNL in PF, DF, INV. Limited in PROM for EV  TTP over medial " malleolus,   MMT: 4/5 throughout  No anterior drawer  - syndesmotic squeeze  Calf compartments are soft and supple  2+ TP and DP pulses with brisk capillary refill to the toes  Sural, saphenous, tibial, superficial, and deep peroneal motor and sensory distributions intact  Sensation to light touch intact distally        Imaging/Studies:     Study: XR left foot  Date: 2024  Report: No radiologist report was available at this time.   I have personally reviewed imaging and my impression is as follows:   FINDINGS:  There is no acute fracture or dislocation.     No significant degenerative changes.     No lytic or blastic osseous lesion.     Soft tissues are unremarkable.     IMPRESSION:  No acute osseous abnormality.      Study: VAS Duplex US Lower Extremity - Left Leg  Date: 2022  Report: I have read and agree with the radiologist report. My impression is as follows:  IMPRESSION:  No evidence of deep vein thrombosis in the left leg    Procedures  No procedures today.      Medical, Surgical, Family, and Social History    The patient's medical history, family history, and social history, were reviewed and updated as appropriate.    Past Medical History:   Diagnosis Date   • Afib (HCC)    • Anxiety      Past Surgical History:   Procedure Laterality Date   •  SECTION     • CHOLECYSTECTOMY       History reviewed. No pertinent family history.  Social History     Occupational History   • Not on file   Tobacco Use   • Smoking status: Former     Current packs/day: 0.00     Types: Cigarettes     Quit date: 2024     Years since quittin.0   • Smokeless tobacco: Never   Vaping Use   • Vaping status: Every Day   • Substances: Flavoring   Substance and Sexual Activity   • Alcohol use: Not Currently   • Drug use: Never   • Sexual activity: Not on file     No Known Allergies    Current Outpatient Medications:   •  naproxen (Naprosyn) 500 mg tablet, Take 1 tablet (500 mg total) by mouth 2 (two) times a day with  meals, Disp: 14 tablet, Rfl: 0  •  metoprolol succinate (TOPROL-XL) 25 mg 24 hr tablet, Take 25 mg by mouth daily (Patient not taking: Reported on 1/9/2024), Disp: , Rfl:   •  nicotine (NICODERM CQ) 14 mg/24hr TD 24 hr patch, Place 1 patch on the skin every 24 hours (Patient not taking: Reported on 1/9/2024), Disp: , Rfl:   •  nicotine polacrilex (COMMIT) 4 MG lozenge, Use one lozenge every 2 to 3 hours as needed for a nicotine craving. (Patient not taking: Reported on 1/9/2024), Disp: , Rfl:       This Visit:     30 minutes was spent in the coordination of care, reviewing of imaging and with the patient on the date of service        Scribe Attestation      I,:  Crissy Washington am acting as a scribe while in the presence of the attending physician.:       I,:  Boogie Lane DO personally performed the services described in this documentation    as scribed in my presence.:             Dr. Boogie Lane DO, Orthopedic Surgeon  [facility]opedic Oncology [facility]

## 2024-01-18 ENCOUNTER — HOSPITAL ENCOUNTER (OUTPATIENT)
Dept: MRI IMAGING | Facility: HOSPITAL | Age: 39
Discharge: HOME | End: 2024-01-18
Attending: STUDENT IN AN ORGANIZED HEALTH CARE EDUCATION/TRAINING PROGRAM
Payer: COMMERCIAL

## 2024-01-18 DIAGNOSIS — M25.572 PAIN OF JOINT OF LEFT ANKLE AND FOOT: ICD-10-CM

## 2024-01-18 PROCEDURE — G1004 CDSM NDSC: HCPCS

## 2024-01-18 PROCEDURE — 73721 MRI JNT OF LWR EXTRE W/O DYE: CPT

## 2024-01-23 ENCOUNTER — APPOINTMENT (OUTPATIENT)
Dept: RADIOLOGY | Facility: MEDICAL CENTER | Age: 39
End: 2024-01-23
Payer: COMMERCIAL

## 2024-01-23 ENCOUNTER — OFFICE VISIT (OUTPATIENT)
Dept: OBGYN CLINIC | Facility: CLINIC | Age: 39
End: 2024-01-23
Payer: COMMERCIAL

## 2024-01-23 VITALS — WEIGHT: 239 LBS | BODY MASS INDEX: 37.51 KG/M2 | HEIGHT: 67 IN | RESPIRATION RATE: 16 BRPM

## 2024-01-23 DIAGNOSIS — M79.89 LEFT LEG SWELLING: ICD-10-CM

## 2024-01-23 DIAGNOSIS — G89.29 CHRONIC PAIN OF LEFT ANKLE: ICD-10-CM

## 2024-01-23 DIAGNOSIS — M25.572 CHRONIC PAIN OF LEFT ANKLE: ICD-10-CM

## 2024-01-23 DIAGNOSIS — M54.16 RADICULOPATHY, LUMBAR REGION: ICD-10-CM

## 2024-01-23 DIAGNOSIS — M54.50 LOW BACK PAIN, UNSPECIFIED BACK PAIN LATERALITY, UNSPECIFIED CHRONICITY, UNSPECIFIED WHETHER SCIATICA PRESENT: Primary | ICD-10-CM

## 2024-01-23 PROCEDURE — 99214 OFFICE O/P EST MOD 30 MIN: CPT | Performed by: STUDENT IN AN ORGANIZED HEALTH CARE EDUCATION/TRAINING PROGRAM

## 2024-01-23 PROCEDURE — 72100 X-RAY EXAM L-S SPINE 2/3 VWS: CPT

## 2024-01-23 RX ORDER — LANOLIN ALCOHOL/MO/W.PET/CERES
1000 CREAM (GRAM) TOPICAL DAILY
COMMUNITY
Start: 2024-01-10

## 2024-01-23 RX ORDER — METHYLPREDNISOLONE 4 MG/1
TABLET ORAL
Qty: 1 EACH | Refills: 0 | Status: SHIPPED | OUTPATIENT
Start: 2024-01-23

## 2024-01-23 NOTE — PROGRESS NOTES
Orthopedic Surgery Office Note  Leonila Burden (38 y.o. female)   : 1985   MRN: [id number]   Encounter Date: 2024 with Dr. Boogie Lane, DO  Chief Complaint   Patient presents with    Left Ankle - Follow-up     MRI results       Assessment, Plan, & Discussion:     Left leg pain/swelling  - discussed MRI not showing specific tear of deformity on ankle as a specific cause of ankle pain/swelling  - discussed XR of lumbar spine with narrowing and possibility of neurogenic edema  - answered questions regarding differential of rheumatoid, explained differences in joint swelling vs subcutaneous swelling and edema   - ordered duplex to r/o DVT as cause of unilateral swelling (prior in September, resolving spontaneously and now returned)      Plan:   Return in about 3 weeks (around 2024).    Surgery:   No surgery planned at this time      Orders:     Diagnoses and all orders for this visit:    Low back pain, unspecified back pain laterality, unspecified chronicity, unspecified whether sciatica present  -     XR spine lumbar 2 or 3 views injury  -     Ambulatory Referral to Physical Therapy; Future  -     VAS VENOUS DUPLEX -LOWER LIMB UNILATERAL; Future  -     methylPREDNISolone 4 MG tablet therapy pack; Use as directed on package    Radiculopathy, lumbar region  -     Ambulatory Referral to Physical Therapy; Future  -     methylPREDNISolone 4 MG tablet therapy pack; Use as directed on package    Left leg swelling  -     Ambulatory Referral to Physical Therapy; Future  -     VAS VENOUS DUPLEX -LOWER LIMB UNILATERAL; Future  -     methylPREDNISolone 4 MG tablet therapy pack; Use as directed on package    Chronic pain of left ankle  -     Ambulatory Referral to Physical Therapy; Future  -     methylPREDNISolone 4 MG tablet therapy pack; Use as directed on package    Other orders  -     vitamin B-12 (VITAMIN B-12) 1,000 mcg tablet; Take 1,000 mcg by mouth daily         History of Present Illness:  "    Leonila Burden is a 38 y.o. female with history of afib, not on blood thinners, who presents for follow up regarding left leg swelling and pain.   Minimal calf pain, no complaints at baseline.    Describes 2 years of problems with her left leg from hip to knee to now foot.     Describes ankle pain as throbbing at the end of the day.     Denies cold/shiny feeling or apperarance. Prior duplex in September, describes not the same symptoms, but continued pain of the knee. Swelling is new since November 1 year prior; resolved on its own and now returned.     She has tried compression socks with relief of swelling everywhere except the toes. She has tried epsom salt soaks.     Prior HPI:   Leonila Burden is a 38 y.o. female who presents for consultation following visit to the [facility], here for orthopedic follow up regarding swelling of the left foot. Patient reports that she noticed the swelling in both feet but that went away. She states that now she is only experiencing the swelling in her left foot as of 1/3-1/4/2024. Patient has taken Naproxen at night that does not help with the swelling. She has tried heating pads that help with the pain at night. She has tried elevation for multiple hours since the onset of swelling that does not decrease the swelling.     Review of Systems  Constitutional: Negative for fatigue, fever or loss of appetite.   HENT: Negative.    Respiratory: Negative for shortness of breath, dyspnea.    Cardiovascular: Negative for chest pain/tightness.   Gastrointestinal: Negative for abdominal pain, N/V.   Endocrine: Negative for cold/heat intolerance, unexplained weight loss/gain.   Genitourinary: Negative for flank pain, dysuria  Skin: Negative for rash.    Psychiatric/Behavioral: Negative for agitation.  All else negative unless otherwise noted in HPI    Physical Exam:   General:  Resp 16   Ht 5' 7\" (1.702 m)   Wt 108 kg (239 lb)   BMI 37.43 kg/m²   Cons: Appears well.  No apparent " distress.  Psych: Alert. Oriented x3.  Mood and affect normal.  Eyes: PERRLA, EOMI  Resp: Normal effort.  No audible wheezing or stridor.  CV: Extremities warm and well perfused.   Abd:    No distention or guarding.   Skin: Warm. No visible lesions.  Neuro: Normal muscle tone.      Orthopedic Exam:    left ankle -   Patient ambulates with steady gait pattern  Uses No assistive device  No anatomical deformity  Skin is warm and dry to touch with no signs of erythema, ecchymosis, infection  No soft tissue swelling or effusion noted  ROM WNL in PF, DF, INV. Limited in PROM for EV  TTP over medial malleolus,   MMT: 4/5 throughout  No anterior drawer  - syndesmotic squeeze  Calf compartments are soft and supple  Intact pulses with brisk capillary refill to the toes  Sensation to light touch intact distally      Imaging/Studies:     XR lumbar spine from 1/23/24 shows possible foramenal narrowing of L5-S1. Mild degenerative changes throghout     Study: MRI left ankle  Date: 1/18/24  Report: I have read and agree with the radiologist report.  I have personally reviewed imaging and my impression is as follows:   Subcutaneous Tissues: Diffuse subcutaneous edema without localized fluid collection.  Joint Effusion: None.  TENDONS:  Achilles tendon: Normal.  Peroneus brevis and longus: Normal.  Posterior tibialis, flexor digitorum longus, flexor hallucis longus: Normal.  Anterior tibialis, extensor digitorum longus, extensor hallucis longus:  Normal.  LIGAMENTS:  Lateral collateral ligament complex:  Normal.  Distal tibiofibular syndesmosis:  Normal.  Medial collateral ligament complex:  Normal.    Study: XR left foot  Date: 1/9/2024  Report: No radiologist report was available at this time.   I have personally reviewed imaging and my impression is as follows:   FINDINGS:  There is no acute fracture or dislocation.  No significant degenerative changes.  No lytic or blastic osseous lesion.  Soft tissues are  unremarkable.  IMPRESSION:  No acute osseous abnormality.     Study: VAS Duplex US Lower Extremity - Left Leg  Date: 2022  Report: I have read and agree with the radiologist report. My impression is as follows:  IMPRESSION:  No evidence of deep vein thrombosis in the left leg    Procedures  No procedures today.      Medical, Surgical, Family, and Social History    The patient's medical history, family history, and social history, were reviewed and updated as appropriate.    Past Medical History:   Diagnosis Date    Afib (HCC)     Anxiety      Past Surgical History:   Procedure Laterality Date     SECTION      CHOLECYSTECTOMY       History reviewed. No pertinent family history.  Social History     Occupational History    Not on file   Tobacco Use    Smoking status: Former     Current packs/day: 0.00     Types: Cigarettes     Quit date: 2024     Years since quittin.0    Smokeless tobacco: Never   Vaping Use    Vaping status: Every Day    Substances: Flavoring   Substance and Sexual Activity    Alcohol use: Not Currently    Drug use: Never    Sexual activity: Not on file     No Known Allergies    Current Outpatient Medications:     methylPREDNISolone 4 MG tablet therapy pack, Use as directed on package, Disp: 1 each, Rfl: 0    naproxen (Naprosyn) 500 mg tablet, Take 1 tablet (500 mg total) by mouth 2 (two) times a day with meals, Disp: 14 tablet, Rfl: 0    vitamin B-12 (VITAMIN B-12) 1,000 mcg tablet, Take 1,000 mcg by mouth daily, Disp: , Rfl:     metoprolol succinate (TOPROL-XL) 25 mg 24 hr tablet, Take 25 mg by mouth daily (Patient not taking: Reported on 2024), Disp: , Rfl:     nicotine (NICODERM CQ) 14 mg/24hr TD 24 hr patch, Place 1 patch on the skin every 24 hours (Patient not taking: Reported on 2024), Disp: , Rfl:     nicotine polacrilex (COMMIT) 4 MG lozenge, Use one lozenge every 2 to 3 hours as needed for a nicotine craving. (Patient not taking: Reported on 2024), Disp: ,  Rfl:       This Visit:     30 minutes was spent in the coordination of care, reviewing of imaging and with the patient on the date of service    Marty Hein PA-C    I, Marty Hein PA-C, scribed this note in conjunction with and in the presence of Dr. Boogie Lane DO, who performed parts of the services as described in this documentation    Dr. Boogie Lane DO, Orthopedic Surgeon  [facility]

## 2024-02-09 ENCOUNTER — EVALUATION (OUTPATIENT)
Dept: PHYSICAL THERAPY | Facility: CLINIC | Age: 39
End: 2024-02-09
Payer: COMMERCIAL

## 2024-02-09 DIAGNOSIS — G89.29 CHRONIC BILATERAL LOW BACK PAIN WITHOUT SCIATICA: ICD-10-CM

## 2024-02-09 DIAGNOSIS — M54.16 RADICULOPATHY, LUMBAR REGION: Primary | ICD-10-CM

## 2024-02-09 DIAGNOSIS — M54.50 CHRONIC BILATERAL LOW BACK PAIN WITHOUT SCIATICA: ICD-10-CM

## 2024-02-09 PROCEDURE — 97162 PT EVAL MOD COMPLEX 30 MIN: CPT | Performed by: PHYSICAL THERAPIST

## 2024-02-09 PROCEDURE — 97014 ELECTRIC STIMULATION THERAPY: CPT | Performed by: PHYSICAL THERAPIST

## 2024-02-09 NOTE — PROGRESS NOTES
PT Evaluation     Today's date: 2024  Patient name: Leonila Burden  : 1985  MRN: [id number]  Referring provider: Boogie Lane DO  Dx:   Encounter Diagnosis     ICD-10-CM    1. Radiculopathy, lumbar region  M54.16       2. Chronic bilateral low back pain without sciatica  M54.50     G89.29                      Assessment  Assessment details: Patient is a 39 year old female who presents to PT with c/o pain and edema t/o left LE. PT examination notes left LE edema, decreased core stability, abnormal gait and increased pain limiting functional ability. Patient would benefit from continued PT intervention including exercises for rom, strength and stability, functional training, manual therapy, HEP, balance training, aerobic conditioning and pain relieving modalities in order to maximize functional independence and mobility.   Impairments: abnormal gait, activity intolerance, impaired balance, impaired physical strength, lacks appropriate home exercise program and pain with function    Goals  ST. Initiate HEP  2. Patient to report decreased pain at worst by 25% in 4 weeks    LT. Patient to report decreased pain at worst by 50% in 8 weeks  2. Patient to decrease edema by 50% in 8 weeks  3. Patient to improve core stability to WFL in 8 weeks    Plan  Patient would benefit from: PT eval and skilled physical therapy  Planned modality interventions: cryotherapy, low level laser therapy, thermotherapy: hydrocollator packs, ultrasound and unattended electrical stimulation  Other planned modality interventions: Modalities PRN  Planned therapy interventions: abdominal trunk stabilization, joint mobilization, manual therapy, massage, balance, balance/weight bearing training, neuromuscular re-education, patient education, strengthening, stretching, therapeutic activities, therapeutic exercise, therapeutic training, graded activity, functional ROM exercises, flexibility, graded exercise and home  exercise program  Frequency: 2x week  Duration in visits: 8  Duration in weeks: 4  Treatment plan discussed with: patient      Subjective Evaluation    History of Present Illness  Date of onset: 1/3/2024  Mechanism of injury: Patient presents to PT with c/o pain and swelling t/o left LE. Patient reports the pain will also shoot up to her back. Patient reports she noticed the swelling the beginning of January. Patient has had issues with her back and left knee for years. Patient was diagnosed with edema in her left LE. She has had testing including MRI, x-ray and bloodwork however no definitive diagnosis has been found. Patient returns to orthopedic next week and is scheduled to have an US next Friday. Patient is now referred to oppt.   Patient Goals  Patient goals for therapy: decreased edema, decreased pain and return to work    Pain  At best pain ratin  At worst pain rating: 10  Quality: throbbing  Relieving factors: heat and medications  Aggravating factors: sitting, standing, stair climbing and walking  Progression: worsening          Objective     Observations   Left Knee   Positive for effusion.   Left Ankle/Foot   Positive for edema.     Neurological Testing     Sensation     Lumbar   Left   Diminished: light touch    Right   Intact: light touch    Ankle/Foot   Left Ankle/Foot   Diminished: light touch    Right Ankle/Foot   Intact: light touch     Active Range of Motion     Lumbar   Normal active range of motion  Left Ankle/Foot   Normal active range of motion    Right Ankle/Foot   Normal active range of motion    Strength/Myotome Testing     Lumbar   Left   Normal strength    Right   Normal strength    Left Ankle/Foot   Normal strength    Right Ankle/Foot   Normal strength    Ambulation     Observational Gait   Gait: antalgic   Decreased walking speed, stride length, left stance time and left step length.     General Comments:    Lower quarter screen   Hips: unremarkable  Knees: unremarkable  Foot/ankle:  unremarkable                Precautions None       Manuals 2/9                                       Neuro Re-Ed                                                                 Ther Ex        Nustep        TR/HR        Standing SLR 3-way        LAQ        QS        SAQ        SLR        Heel Slides                                Ther Activity                        Gait Training                        Modalities        IFC with P 15 min

## 2024-02-12 ENCOUNTER — OFFICE VISIT (OUTPATIENT)
Dept: PHYSICAL THERAPY | Facility: CLINIC | Age: 39
End: 2024-02-12
Payer: COMMERCIAL

## 2024-02-12 DIAGNOSIS — M54.16 RADICULOPATHY, LUMBAR REGION: Primary | ICD-10-CM

## 2024-02-12 PROCEDURE — 97014 ELECTRIC STIMULATION THERAPY: CPT

## 2024-02-12 PROCEDURE — 97110 THERAPEUTIC EXERCISES: CPT

## 2024-02-12 NOTE — PROGRESS NOTES
Daily Note     Today's date: 2024  Patient name: Leonila Burden  : 1985  MRN: [id number]  Referring provider: Boogie Lane DO  Dx:   Encounter Diagnosis     ICD-10-CM    1. Radiculopathy, lumbar region  M54.16                      Subjective: Patient reports ankle is swollen and painful. Patient reports pain in left ankle is causing right hip pain secondary to putting more weight on the right.       Objective: See treatment diary below.      Assessment: Patient had swelling in left ankle today. Patient was able to tolerate first treatment well, added ankle pumps with elevation to HEP.     Plan: Continue per plan of care.         Precautions None       Manuals                                       Neuro Re-Ed                                                                 Ther Ex        Nustep  SRC L1 10'       TR/HR  2x10      Standing SLR 3-way  2x10 each B      LAQ  2x10      QS  2x10      SAQ  2x10      SLR        Heel Slides        Ankle pumps in elevation  50x                      Ther Activity                        Gait Training                        Modalities        IFC with MHP 15 min 15 min

## 2024-02-14 ENCOUNTER — OFFICE VISIT (OUTPATIENT)
Dept: PHYSICAL THERAPY | Facility: CLINIC | Age: 39
End: 2024-02-14
Payer: COMMERCIAL

## 2024-02-14 DIAGNOSIS — G89.29 CHRONIC BILATERAL LOW BACK PAIN WITHOUT SCIATICA: ICD-10-CM

## 2024-02-14 DIAGNOSIS — M54.50 CHRONIC BILATERAL LOW BACK PAIN WITHOUT SCIATICA: ICD-10-CM

## 2024-02-14 DIAGNOSIS — M54.16 RADICULOPATHY, LUMBAR REGION: Primary | ICD-10-CM

## 2024-02-14 PROCEDURE — 97014 ELECTRIC STIMULATION THERAPY: CPT | Performed by: PHYSICAL THERAPIST

## 2024-02-14 NOTE — PROGRESS NOTES
"Daily Note     Today's date: 2024  Patient name: Leonila Burden  : 1985  MRN: [id number]  Referring provider: Boogie Lane DO  Dx:   Encounter Diagnosis     ICD-10-CM    1. Radiculopathy, lumbar region  M54.16       2. Chronic bilateral low back pain without sciatica  M54.50     G89.29                      Subjective: Patient reports \"I overdid it and my leg has been hurting for 2 days\".      Objective: See treatment diary below      Assessment: Held exercise today due to increased swelling and pain. Applied IFC today for pain relief. Will assess symptoms at [place] and attempt exercise when able.       Plan: Continue per plan of care.      Precautions None       Manuals                                      Neuro Re-Ed                                                                 Ther Ex        Nustep  SRC L1 10'  Held     TR/HR  2x10 Held     Standing SLR 3-way  2x10 each B Held     LAQ  2x10 Held     QS  2x10 Held     SAQ  2x10 Held     SLR        Heel Slides        Ankle pumps in elevation  50x Held                     Ther Activity                        Gait Training                        Modalities        IFC with P 15 min 15 min 15 min                         "

## 2024-02-16 ENCOUNTER — HOSPITAL ENCOUNTER (OUTPATIENT)
Dept: NON INVASIVE DIAGNOSTICS | Facility: HOSPITAL | Age: 39
Discharge: HOME | End: 2024-02-16
Payer: COMMERCIAL

## 2024-02-16 DIAGNOSIS — M54.50 LOW BACK PAIN, UNSPECIFIED BACK PAIN LATERALITY, UNSPECIFIED CHRONICITY, UNSPECIFIED WHETHER SCIATICA PRESENT: ICD-10-CM

## 2024-02-16 DIAGNOSIS — M79.89 LEFT LEG SWELLING: ICD-10-CM

## 2024-02-16 PROCEDURE — 93971 EXTREMITY STUDY: CPT

## 2024-02-17 PROCEDURE — 93971 EXTREMITY STUDY: CPT | Performed by: SURGERY

## 2024-02-19 ENCOUNTER — APPOINTMENT (OUTPATIENT)
Dept: PHYSICAL THERAPY | Facility: CLINIC | Age: 39
End: 2024-02-19
Payer: COMMERCIAL

## 2024-02-20 ENCOUNTER — OFFICE VISIT (OUTPATIENT)
Dept: OBGYN CLINIC | Facility: CLINIC | Age: 39
End: 2024-02-20
Payer: COMMERCIAL

## 2024-02-20 VITALS — HEIGHT: 67 IN | RESPIRATION RATE: 16 BRPM | BODY MASS INDEX: 37.51 KG/M2 | WEIGHT: 239 LBS

## 2024-02-20 DIAGNOSIS — M54.50 LOW BACK PAIN, UNSPECIFIED BACK PAIN LATERALITY, UNSPECIFIED CHRONICITY, UNSPECIFIED WHETHER SCIATICA PRESENT: ICD-10-CM

## 2024-02-20 DIAGNOSIS — G89.29 CHRONIC PAIN OF LEFT ANKLE: Primary | ICD-10-CM

## 2024-02-20 DIAGNOSIS — M79.89 LEFT LEG SWELLING: ICD-10-CM

## 2024-02-20 DIAGNOSIS — M25.572 CHRONIC PAIN OF LEFT ANKLE: Primary | ICD-10-CM

## 2024-02-20 PROCEDURE — 99214 OFFICE O/P EST MOD 30 MIN: CPT | Performed by: STUDENT IN AN ORGANIZED HEALTH CARE EDUCATION/TRAINING PROGRAM

## 2024-02-20 NOTE — PROGRESS NOTES
Orthopedic Surgery Office Note  Leonila Burden (39 y.o. female)   : 1985   MRN: [id number]   Encounter Date: 2024 with Dr. Boogie Lane, DO  Chief Complaint   Patient presents with    Left Ankle - Follow-up     Patient is still having pain and swelling.        Assessment, Plan, & Discussion:     Left leg pain/swelling  Discussed with the patient that:  - MRI of left ankle is negative for internal derangement as a course for ankle pain/swelling  - XR of lumbar spine with narrowing and possibility of neurogenic edema  - Duplex was negative as a source for unilateral LLE swelling  - Referral for vascular surgery placed to determine if the swelling and pain is related to vascular pathology  - Referral for comprehensive spine and pain placed for pain management  - MRI of low back ordered to rule out spinal compression/foraminal stenosis  - Patient will follow-up after MRI has been completed  - The patient expresses understanding and is in agreement with today's treatment plan.   -At this moment patient has no direct ankle orthopedic evidence for her recurrent swelling and pain.  She may have an element of CRPS but does not have a specific trauma to this area.  Attempting a full workup for how she could have unilateral swelling and lower extremity pain without trauma or DVT.        Plan:   Return for After MRI.    Surgery:   No surgery planned at this time      Orders:     Diagnoses and all orders for this visit:    Chronic pain of left ankle  -     Cancel: Ambulatory referral to Spine & Pain Management; Future  -     Ambulatory referral to Spine & Pain Management; Future    Low back pain, unspecified back pain laterality, unspecified chronicity, unspecified whether sciatica present  -     Cancel: MRI lumbar spine wo contrast; Future  -     MRI lumbar spine wo contrast; Future    Left leg swelling  -     Cancel: Ambulatory Referral to Vascular Surgery; Future  -     Cancel: Ambulatory referral to  Spine & Pain Management; Future  -     Ambulatory referral to Spine & Pain Management; Future  -     Ambulatory Referral to Vascular Surgery; Future         History of Present Illness:     Leonila Burden is a 39 y.o. female with history of afib, not on blood thinners, who presents for follow up regarding left leg swelling and pain.   Minimal calf pain, no complaints at baseline.    Describes 2 years of problems with her left leg from hip to knee to now foot.     Describes ankle pain as throbbing at the end of the day.     Denies cold/shiny feeling or apperarance. Prior duplex in September, describes not the same symptoms, but continued pain of the knee. Swelling is new since November 1 year prior; resolved on its own and now returned.     She has tried compression socks with relief of swelling everywhere except the toes. She has tried epsom salt soaks.     Prior HPI:   Leonila Burden is a 38 y.o. female who presents for consultation following visit to the [facility], here for orthopedic follow up regarding swelling of the left foot. Patient reports that she noticed the swelling in both feet but that went away. She states that now she is only experiencing the swelling in her left foot as of 1/3-1/4/2024. Patient has taken Naproxen at night that does not help with the swelling. She has tried heating pads that help with the pain at night. She has tried elevation for multiple hours since the onset of swelling that does not decrease the swelling.     On presentation today, she reports that she is still having pain and swelling in the left ankle. She has been attending physical therapy but has had to cut back on exercises due to swelling and pain. She does report that overall she is doing well. She states that the only treatment that helps the ankle/foot swelling is elevation while at home. She reports that with the start of physical therapy, she has started to experience left knee pain that has flared up.    Review of  "Systems  Constitutional: Negative for fatigue, fever or loss of appetite.   HENT: Negative.    Respiratory: Negative for shortness of breath, dyspnea.    Cardiovascular: Negative for chest pain/tightness.   Gastrointestinal: Negative for abdominal pain, N/V.   Endocrine: Negative for cold/heat intolerance, unexplained weight loss/gain.   Genitourinary: Negative for flank pain, dysuria  Skin: Negative for rash.    Psychiatric/Behavioral: Negative for agitation.  All else negative unless otherwise noted in HPI    Physical Exam:   General:  Resp 16   Ht 5' 7\" (1.702 m)   Wt 108 kg (239 lb)   BMI 37.43 kg/m²   Cons: Appears well.  No apparent distress.  Psych: Alert. Oriented x3.  Mood and affect normal.  Eyes: PERRLA, EOMI  Resp: Normal effort.  No audible wheezing or stridor.  CV: Extremities warm and well perfused.   Abd:    No distention or guarding.   Skin: Warm. No visible lesions.  Neuro: Normal muscle tone.      Orthopedic Exam:    left ankle -   Patient ambulates with steady gait pattern  Uses No assistive device  No anatomical deformity  Skin is warm and dry to touch with no signs of erythema, ecchymosis, infection  No soft tissue swelling or effusion noted  ROM WNL in PF, DF, INV. Limited in PROM for EV  TTP over medial malleolus,   MMT: 4/5 throughout  No anterior drawer  - syndesmotic squeeze  Calf compartments are soft and supple  Intact pulses with brisk capillary refill to the toes  Sensation to light touch intact distally      Imaging/Studies:     Study: VAS Venous Duplex- lower limb unilateral  Date: 2/16/2024  Report: I have read and agree with radiologist report.  CONCLUSION:     Impression:  RIGHT LOWER LIMB LIMITED:  Evaluation shows no evidence of thrombus in the common femoral vein.  Doppler evaluation shows a normal response to augmentation maneuvers.     LEFT LOWER LIMB:  No evidence of acute or chronic deep vein thrombosis  No evidence of superficial thrombophlebitis noted.  Doppler " evaluation shows a normal response to augmentation maneuvers.  Popliteal, posterior tibial and anterior tibial arterial Doppler waveforms are  triphasic.      Study: XR lumbar spine  Date: 1/23/24   Report: No radiologist report available. I have personally reviewed all imaging. My impression is as follows:  IMPRESSION:  X-ray shows possible foramenal narrowing of L5-S1. Mild degenerative changes throughout     Study: MRI left ankle  Date: 1/18/24  Report: I have read and agree with the radiologist report.  I have personally reviewed imaging and my impression is as follows:   Subcutaneous Tissues: Diffuse subcutaneous edema without localized fluid collection.  Joint Effusion: None.  TENDONS:  Achilles tendon: Normal.  Peroneus brevis and longus: Normal.  Posterior tibialis, flexor digitorum longus, flexor hallucis longus: Normal.  Anterior tibialis, extensor digitorum longus, extensor hallucis longus:  Normal.  LIGAMENTS:  Lateral collateral ligament complex:  Normal.  Distal tibiofibular syndesmosis:  Normal.  Medial collateral ligament complex:  Normal.    Study: XR left foot  Date: 1/9/2024  Report: No radiologist report was available at this time.   I have personally reviewed imaging and my impression is as follows:   FINDINGS:  There is no acute fracture or dislocation.  No significant degenerative changes.  No lytic or blastic osseous lesion.  Soft tissues are unremarkable.  IMPRESSION:  No acute osseous abnormality.     Study: VAS Duplex US Lower Extremity - Left Leg  Date: 09/22/2022  Report: I have read and agree with the radiologist report. My impression is as follows:  IMPRESSION:  No evidence of deep vein thrombosis in the left leg    Procedures  No procedures today.      Medical, Surgical, Family, and Social History    The patient's medical history, family history, and social history, were reviewed and updated as appropriate.    Past Medical History:   Diagnosis Date    Afib (HCC)     Anxiety      Past  Surgical History:   Procedure Laterality Date     SECTION      CHOLECYSTECTOMY       History reviewed. No pertinent family history.  Social History     Occupational History    Not on file   Tobacco Use    Smoking status: Former     Current packs/day: 0.00     Types: Cigarettes     Quit date: 2024     Years since quittin.1    Smokeless tobacco: Never   Vaping Use    Vaping status: Every Day    Substances: Flavoring   Substance and Sexual Activity    Alcohol use: Not Currently    Drug use: Never    Sexual activity: Not on file     No Known Allergies    Current Outpatient Medications:     naproxen (Naprosyn) 500 mg tablet, Take 1 tablet (500 mg total) by mouth 2 (two) times a day with meals, Disp: 14 tablet, Rfl: 0    vitamin B-12 (VITAMIN B-12) 1,000 mcg tablet, Take 1,000 mcg by mouth daily, Disp: , Rfl:     methylPREDNISolone 4 MG tablet therapy pack, Use as directed on package, Disp: 1 each, Rfl: 0    metoprolol succinate (TOPROL-XL) 25 mg 24 hr tablet, Take 25 mg by mouth daily (Patient not taking: Reported on 2024), Disp: , Rfl:     nicotine (NICODERM CQ) 14 mg/24hr TD 24 hr patch, Place 1 patch on the skin every 24 hours (Patient not taking: Reported on 2024), Disp: , Rfl:     nicotine polacrilex (COMMIT) 4 MG lozenge, Use one lozenge every 2 to 3 hours as needed for a nicotine craving. (Patient not taking: Reported on 2024), Disp: , Rfl:       This Visit:     20 minutes was spent in the coordination of care, reviewing of imaging and with the patient on the date of service    Scribe Attestation      I,:  Crissy Washington am acting as a scribe while in the presence of the attending physician.:       I,:  Boogie Lane DO personally performed the services described in this documentation    as scribed in my presence.:               Dr. Boogie Lane DO, Orthopedic Surgeon  [facility]

## 2024-02-22 ENCOUNTER — OFFICE VISIT (OUTPATIENT)
Dept: PHYSICAL THERAPY | Facility: CLINIC | Age: 39
End: 2024-02-22
Payer: COMMERCIAL

## 2024-02-22 DIAGNOSIS — M54.16 RADICULOPATHY, LUMBAR REGION: Primary | ICD-10-CM

## 2024-02-22 PROCEDURE — 97014 ELECTRIC STIMULATION THERAPY: CPT

## 2024-02-22 PROCEDURE — 97110 THERAPEUTIC EXERCISES: CPT

## 2024-02-22 NOTE — PROGRESS NOTES
"Daily Note     Today's date: 2024  Patient name: Leonila Burden  : 1985  MRN: [id number]  Referring provider: Boogie Lane DO  Dx:   Encounter Diagnosis     ICD-10-CM    1. Radiculopathy, lumbar region  M54.16                      Subjective: Patient reports ankle is bothering her today. \"It is still swollen.\"      Objective: See treatment diary below      Assessment: Tolerated treatment well. Patient exhibited good technique with therapeutic exercises      Plan: Continue per plan of care.      Precautions None       Manuals                                     Neuro Re-Ed                                                                 Ther Ex        Nustep  SRC L1 10'  Held SRC l2 10 min    TR/HR  2x10 Held 2x10    Standing SLR 3-way  2x10 each B Held 2x10 each B    LAQ  2x10 Held 2x10    QS  2x10 Held 2x10    SAQ  2x10 Held 2x10    SLR        Heel Slides        Ankle pumps in elevation  50x Held 50x                    Ther Activity                        Gait Training                        Modalities        IFC with MHP 15 min 15 min 15 min 15 min                          "

## 2024-02-23 ENCOUNTER — APPOINTMENT (OUTPATIENT)
Dept: PHYSICAL THERAPY | Facility: CLINIC | Age: 39
End: 2024-02-23
Payer: COMMERCIAL

## 2024-02-27 ENCOUNTER — OFFICE VISIT (OUTPATIENT)
Dept: PHYSICAL THERAPY | Facility: CLINIC | Age: 39
End: 2024-02-27
Payer: COMMERCIAL

## 2024-02-27 DIAGNOSIS — M54.16 RADICULOPATHY, LUMBAR REGION: Primary | ICD-10-CM

## 2024-02-27 DIAGNOSIS — G89.29 CHRONIC BILATERAL LOW BACK PAIN WITHOUT SCIATICA: ICD-10-CM

## 2024-02-27 DIAGNOSIS — M54.50 CHRONIC BILATERAL LOW BACK PAIN WITHOUT SCIATICA: ICD-10-CM

## 2024-02-27 PROCEDURE — 97014 ELECTRIC STIMULATION THERAPY: CPT

## 2024-02-27 PROCEDURE — 97110 THERAPEUTIC EXERCISES: CPT

## 2024-02-27 NOTE — PROGRESS NOTES
"Daily Note     Today's date: 2024  Patient name: Leonila Burden  : 1985  MRN: [id number]  Referring provider: Boogie Lane DO  Dx:   Encounter Diagnosis     ICD-10-CM    1. Radiculopathy, lumbar region  M54.16       2. Chronic bilateral low back pain without sciatica  M54.50     G89.29           Start Time: 0900  Stop Time: 0955  Total time in clinic (min): 55 minutes    Subjective: Patient reported having a throbbing in her L knee today. She has an MRI scheduled 3/4/24 for L hip.       Objective: See treatment diary below      Assessment: Patient completed Nustep vs SRC today to avoid further discomfort in knee/hip. She had increased discomfort in L hip with standing SLR-3 way, unable to complete. Responded well to added TE in seated. HP applied to L hip in seated, post treat, with good skin integrity.       Plan: Continue per plan of care.      Precautions None       Manuals                                    Neuro Re-Ed                                                                 Ther Ex        Nustep  SRC L1 10'  Held SRC l2 10 min Nustep L2 20'   TR/HR  2x10 Held 2x10 2x10    Standing SLR 3-way  2x10 each B Held 2x10 each B 10x forward only    LAQ  2x10 Held 2x10 2x10 each    QS  2x10 Held 2x10    SAQ  2x10 Held 2x10    SLR        Heel Slides        Ankle pumps in elevation  50x Held 50x 50x    Hip add     20x 3\" hold    Hip abd      GTB 2x10    Ther Activity                        Gait Training                        Modalities        IFC with MHP 15 min 15 min 15 min 15 min  HP L hip  15 min IFC/HP L knee                            "

## 2024-02-28 ENCOUNTER — CONSULT (OUTPATIENT)
Dept: PAIN MEDICINE | Facility: CLINIC | Age: 39
End: 2024-02-28
Payer: COMMERCIAL

## 2024-02-28 VITALS
WEIGHT: 239 LBS | DIASTOLIC BLOOD PRESSURE: 69 MMHG | HEIGHT: 67 IN | BODY MASS INDEX: 37.51 KG/M2 | RESPIRATION RATE: 16 BRPM | HEART RATE: 76 BPM | SYSTOLIC BLOOD PRESSURE: 101 MMHG

## 2024-02-28 DIAGNOSIS — M79.89 LEFT LEG SWELLING: ICD-10-CM

## 2024-02-28 DIAGNOSIS — G89.29 CHRONIC PAIN OF LEFT ANKLE: ICD-10-CM

## 2024-02-28 DIAGNOSIS — M25.572 CHRONIC PAIN OF LEFT ANKLE: ICD-10-CM

## 2024-02-28 PROCEDURE — 99204 OFFICE O/P NEW MOD 45 MIN: CPT | Performed by: ANESTHESIOLOGY

## 2024-02-28 NOTE — PROGRESS NOTES
Assessment:  1. Left leg swelling    2. Chronic pain of left ankle        Plan:  Patient is a 39-year-old female complains of low back pain and left leg pain chronic pain syndrome secondary lumbar degenerative disease, lumbar radiculopathy presents to office for initial consultation.  Patient describes burning, cramping, throbbing pain into the left leg and left knee occasionally down into the calf.  Patient describes pain which appears to be in the L4 and L5 nerve root distribution of the left lower extremity.  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  Patient reports decreased quality of sleep secondary to left lower extremity pain.  Patient has started physical therapy and noted some difficulties performing exercise secondary to radicular symptoms.  1.  We will await the results of patient's MRI of the lumbar spine  2.  Patient will continue physical therapy lumbar core strengthening  3.  Follow-up in 1 month to review diagnostic imaging    History of Present Illness:    The patient is a 39 y.o. female who presents for consultation in regards to Foot Pain (left), Leg Pain (left), and Ankle Pain (left).  Symptoms have been present for 6 months. Symptoms began without any precipitating injury or trauma. Pain is reported to be 6 on the numeric rating scale.  Symptoms are felt nearly constantly and worst in the evening, nighttime.  Symptoms are characterized as burning, numbing, and throbbing.  Symptoms are associated with left leg weakness.  Aggravating factors include kneeling, standing, bending, leaning forward, leaning bckward, walking, and exercise.  Relieving factors include lying down, standing, sitting, and relaxation.  No change in symptoms with sitting, turning the head, coughing/sneezing, and bowel movements.  Treatments that have been helpful include heat/ice. home exercise have provided no relief.  Medications to  "relieve symptoms include Tylenol.    Review of Systems:    Review of Systems   Musculoskeletal:  Positive for back pain, gait problem and joint swelling.   All other systems reviewed and are negative.          Past Medical History:   Diagnosis Date    Afib (HCC)     Anxiety        Past Surgical History:   Procedure Laterality Date     SECTION      CHOLECYSTECTOMY         History reviewed. No pertinent family history.    Social History     Occupational History    Not on file   Tobacco Use    Smoking status: Former     Current packs/day: 0.00     Types: Cigarettes     Quit date: 2024     Years since quittin.1     Passive exposure: Never    Smokeless tobacco: Never   Vaping Use    Vaping status: Every Day    Substances: Flavoring   Substance and Sexual Activity    Alcohol use: Not Currently    Drug use: Never    Sexual activity: Yes     Partners: Male         Current Outpatient Medications:     naproxen (Naprosyn) 500 mg tablet, Take 1 tablet (500 mg total) by mouth 2 (two) times a day with meals, Disp: 14 tablet, Rfl: 0    vitamin B-12 (VITAMIN B-12) 1,000 mcg tablet, Take 1,000 mcg by mouth daily, Disp: , Rfl:     methylPREDNISolone 4 MG tablet therapy pack, Use as directed on package, Disp: 1 each, Rfl: 0    metoprolol succinate (TOPROL-XL) 25 mg 24 hr tablet, Take 25 mg by mouth daily (Patient not taking: Reported on 2024), Disp: , Rfl:     nicotine (NICODERM CQ) 14 mg/24hr TD 24 hr patch, Place 1 patch on the skin every 24 hours (Patient not taking: Reported on 2024), Disp: , Rfl:     nicotine polacrilex (COMMIT) 4 MG lozenge, Use one lozenge every 2 to 3 hours as needed for a nicotine craving. (Patient not taking: Reported on 2024), Disp: , Rfl:     No Known Allergies    Physical Exam:    /69   Pulse 76   Resp 16   Ht 5' 7\" (1.702 m)   Wt 108 kg (239 lb)   BMI 37.43 kg/m²     Constitutional: normal, well developed, well nourished, alert, in no distress and non-toxic and no " overt pain behavior. and obese  Eyes: anicteric  HEENT: grossly intact  Neck: supple, symmetric, trachea midline and no masses   Pulmonary:even and unlabored  Cardiovascular:No edema or pitting edema present  Skin:Normal without rashes or lesions and well hydrated  Psychiatric:Mood and affect appropriate  Neurologic:Cranial Nerves II-XII grossly intact  Musculoskeletal:antalgic    Lumbar/Sacral Spine examination demonstrates.  Full range of motion lumbar spine with pain upon: flexion, lateral rotation to the left/right, and bending to the left/right.  Bilateral lumbar paraspinals tender to palpation. Muscle spasms noted in the lumbar area bilaterally. 4/5 left lower extremity strength in all muscle groups. Positive seated straight leg raise for bilateral lower extremities.  Sensitivity to light touch intact bilateral lower extremities. 2+ reflexes in the patella and Achilles.  No ankle clonus    Imaging      Study Result    Narrative & Impression         LUMBAR SPINE     INDICATION:   Low back pain, unspecified.      COMPARISON:  There are no previous examinations available for comparison.     VIEWS:  XR SPINE LUMBAR 2 OR 3 VIEWS INJURY  Images: 3     FINDINGS:     There are 5 non rib bearing lumbar vertebral bodies.      There is no evidence of acute fracture or destructive osseous lesion. Chronic fractures bilateral L1 vertebral body transverse processes.     Alignment is unremarkable.      No significant lumbar degenerative change noted.     The pedicles appear intact.     Soft tissues are unremarkable.     IMPRESSION:        No acute osseous pathology or significant degenerative change.     Electronically signed: 01/23/2024 02:18 PM Rickey Schmidt, MPH,MD         Study Result    Narrative & Impression   MRI ANKLE/HEEL LEFT WO CONTRAST     INDICATION:   M25.572: Pain in left ankle and joints of left foot.     COMPARISON: X-ray left ankle dated 1/3/2024.     TECHNIQUE:   Multiplanar/multisequence MR of the left  ankle was performed.        FINDINGS:     Subcutaneous Tissues: Diffuse subcutaneous edema without localized fluid collection.     Joint Effusion: None.     TENDONS:  Achilles tendon: Normal.  Peroneus brevis and longus: Normal.  Posterior tibialis, flexor digitorum longus, flexor hallucis longus: Normal.  Anterior tibialis, extensor digitorum longus, extensor hallucis longus:  Normal.     LIGAMENTS:  Lateral collateral ligament complex:  Normal.  Distal tibiofibular syndesmosis:  Normal.  Medial collateral ligament complex:  Normal.     Plantar Fascia:  Normal.     Articular Surfaces:  Normal.     Sinus Tarsi:  Normal.     Tarsal Tunnel: Unremarkable.     Bones:  Normal signal intensity.     Musculature:  Normal.     IMPRESSION:     Diffuse subcutaneous edema without localized fluid collection.     Otherwise, unremarkable MRI of the left ankle.              Workstation performed: [id number]     No orders to display       No orders of the defined types were placed in this encounter.

## 2024-02-29 ENCOUNTER — OFFICE VISIT (OUTPATIENT)
Dept: PHYSICAL THERAPY | Facility: CLINIC | Age: 39
End: 2024-02-29
Payer: COMMERCIAL

## 2024-02-29 DIAGNOSIS — M54.16 RADICULOPATHY, LUMBAR REGION: Primary | ICD-10-CM

## 2024-02-29 DIAGNOSIS — G89.29 CHRONIC BILATERAL LOW BACK PAIN WITHOUT SCIATICA: ICD-10-CM

## 2024-02-29 DIAGNOSIS — M54.50 CHRONIC BILATERAL LOW BACK PAIN WITHOUT SCIATICA: ICD-10-CM

## 2024-02-29 PROCEDURE — 97014 ELECTRIC STIMULATION THERAPY: CPT

## 2024-02-29 PROCEDURE — 97110 THERAPEUTIC EXERCISES: CPT

## 2024-02-29 NOTE — PROGRESS NOTES
"Daily Note     Today's date: 2024  Patient name: Leonila Burden  : 1985  MRN: [id number]  Referring provider: Boogie Lane DO  Dx:   Encounter Diagnosis     ICD-10-CM    1. Radiculopathy, lumbar region  M54.16       2. Chronic bilateral low back pain without sciatica  M54.50     G89.29           Start Time: 0900  Stop Time: 1000  Total time in clinic (min): 60 minutes    Subjective:  Patient indicated that her low back is in a lot of pain today.             Objective: See treatment diary below.            Assessment: Therapeutic exercise program was completed with good technique but patient experienced an increase in left lumbar/hip pain with standing exercise and putting weight through left LE. Ended therapy session with MHP to left knee and lumbar region and TENs on left knee.   Continue to recommend PT in order to achieve maximal functional independence.        Plan: Continue per plan of care.      Precautions None       Manuals                                        Neuro Re-Ed                                                                         Ther Ex         Nustep  SRC L1 10'  Held SRC l2 10 min Nustep L2 20' L2 25'   TR/HR  2x10 Held 2x10 2x10  2x10    Standing SLR 3-way  2x10 each B Held 2x10 each B 10x forward only  10x forward only   LAQ  2x10 Held 2x10 2x10 each  2x10   QS  2x10 Held 2x10  2x10   SAQ  2x10 Held 2x10  2x10   SLR         Heel Slides         Ankle pumps in elevation  50x Held 50x 50x  NT   Hip add     Sitting 20x 3\" hold  20x3\" hold    Hip abd      Sitting GTB 2x10  Sitting GTB 2x10    Ther Activity                           Gait Training                           Modalities         IFC with MHP 15 min 15 min 15 min 15 min  HP L hip  15 min IFC/HP L knee  HP L hip 15 min IFC/HP L knee                               "

## 2024-03-05 ENCOUNTER — HOSPITAL ENCOUNTER (OUTPATIENT)
Dept: MRI IMAGING | Facility: HOSPITAL | Age: 39
Discharge: HOME | End: 2024-03-05
Attending: STUDENT IN AN ORGANIZED HEALTH CARE EDUCATION/TRAINING PROGRAM
Payer: COMMERCIAL

## 2024-03-05 DIAGNOSIS — M54.50 LOW BACK PAIN, UNSPECIFIED BACK PAIN LATERALITY, UNSPECIFIED CHRONICITY, UNSPECIFIED WHETHER SCIATICA PRESENT: ICD-10-CM

## 2024-03-05 PROCEDURE — 72148 MRI LUMBAR SPINE W/O DYE: CPT

## 2024-03-05 PROCEDURE — G1004 CDSM NDSC: HCPCS

## 2024-03-13 ENCOUNTER — OFFICE VISIT (OUTPATIENT)
Dept: PAIN MEDICINE | Facility: CLINIC | Age: 39
End: 2024-03-13
Payer: COMMERCIAL

## 2024-03-13 VITALS
DIASTOLIC BLOOD PRESSURE: 69 MMHG | SYSTOLIC BLOOD PRESSURE: 100 MMHG | RESPIRATION RATE: 16 BRPM | BODY MASS INDEX: 39.08 KG/M2 | WEIGHT: 249 LBS | HEIGHT: 67 IN | HEART RATE: 76 BPM

## 2024-03-13 DIAGNOSIS — M47.816 LUMBAR SPONDYLOSIS: ICD-10-CM

## 2024-03-13 DIAGNOSIS — M51.16 LUMBAR DISC DISEASE WITH RADICULOPATHY: Primary | ICD-10-CM

## 2024-03-13 DIAGNOSIS — G89.4 CHRONIC PAIN SYNDROME: ICD-10-CM

## 2024-03-13 PROCEDURE — 99214 OFFICE O/P EST MOD 30 MIN: CPT | Performed by: ANESTHESIOLOGY

## 2024-03-13 NOTE — PROGRESS NOTES
Assessment:  1. Lumbar disc disease with radiculopathy    2. Lumbar spondylosis    3. Chronic pain syndrome        Plan:  Patient is a 39-year-old female complains of low back pain and left leg pain chronic pain syndrome secondary lumbar degenerative disease, lumbar radiculopathy presents to office for follow-up visit.  MRI lumbar spine reviewed which does show disc desiccation L4-5 L5-S1 degenerative disc changes and mild foraminal narrowing bilaterally at the L5-S1.  At this time we will exhaust conservative therapy since patient does report some alleviation of symptoms with physical therapy.  Patient does report she takes naproxen at nighttime which helps her to be able to sleep however she is tolerating the pain and the swelling in the left lower extremity in the daytime.  1.  Patient will continue home exercise regimen and join a gym for lumbar core strengthening exercises  2.  Follow-up in 2 months which will consider interventional management.  3.  We will consider a L5-S1 and S1 transforaminal epidural steroid injection left side      I have spent a total time of 45 minutes on 03/13/24 in caring for this patient including Diagnostic results, Risks and benefits of tx options, and Reviewing / ordering tests, medicine, procedures  .    History of Present Illness:  The patient is a 39 y.o. female who presents for a follow up office visit in regards to Back Pain (Review MRI) and Leg Pain (left).   The patient’s current symptoms include 7 out of 10 constant throbbing, numbness into the left lower extremity worse in the evening and nighttime.    Current pain medications includes: Naproxen.  The patient reports that this regimen is providing 50% pain relief.  The patient is reporting no side effects from this pain medication regimen.    I have personally reviewed and/or updated the patient's past medical history, past surgical history, family history, social history, current medications, allergies, and vital signs  today.         Review of Systems  Review of Systems   Musculoskeletal:  Positive for back pain.   All other systems reviewed and are negative.          Past Medical History:   Diagnosis Date    Afib (HCC)     Anxiety        Past Surgical History:   Procedure Laterality Date     SECTION      CHOLECYSTECTOMY         History reviewed. No pertinent family history.    Social History     Occupational History    Not on file   Tobacco Use    Smoking status: Former     Current packs/day: 0.00     Types: Cigarettes     Quit date: 2024     Years since quittin.1     Passive exposure: Never    Smokeless tobacco: Never   Vaping Use    Vaping status: Every Day    Substances: Flavoring   Substance and Sexual Activity    Alcohol use: Not Currently    Drug use: Never    Sexual activity: Yes     Partners: Male         Current Outpatient Medications:     methylPREDNISolone 4 MG tablet therapy pack, Use as directed on package, Disp: 1 each, Rfl: 0    naproxen (Naprosyn) 500 mg tablet, Take 1 tablet (500 mg total) by mouth 2 (two) times a day with meals, Disp: 14 tablet, Rfl: 0    vitamin B-12 (VITAMIN B-12) 1,000 mcg tablet, Take 1,000 mcg by mouth daily, Disp: , Rfl:     metoprolol succinate (TOPROL-XL) 25 mg 24 hr tablet, Take 25 mg by mouth daily (Patient not taking: Reported on 2024), Disp: , Rfl:     nicotine (NICODERM CQ) 14 mg/24hr TD 24 hr patch, Place 1 patch on the skin every 24 hours (Patient not taking: Reported on 2024), Disp: , Rfl:     nicotine polacrilex (COMMIT) 4 MG lozenge, Use one lozenge every 2 to 3 hours as needed for a nicotine craving. (Patient not taking: Reported on 2024), Disp: , Rfl:     No Known Allergies    Physical Exam:    There were no vitals taken for this visit.    Constitutional:normal, well developed, well nourished, alert, in no distress and non-toxic and no overt pain behavior. and obese  Eyes:anicteric  HEENT:grossly intact  Neck:supple, symmetric, trachea midline and  no masses   Pulmonary:even and unlabored  Cardiovascular:No edema or pitting edema present  Skin:Normal without rashes or lesions and well hydrated  Psychiatric:Mood and affect appropriate  Neurologic:Cranial Nerves II-XII grossly intact  Musculoskeletal:antalgic    Imaging    Study Result    Narrative & Impression   MRI LUMBAR SPINE WITHOUT CONTRAST     INDICATION: M54.50: Low back pain, unspecified.     COMPARISON: Plain film 1/23/2024.     TECHNIQUE:  Multiplanar, multisequence imaging of the lumbar spine was performed. .        IMAGE QUALITY:  Diagnostic     FINDINGS:     VERTEBRAL BODIES:  There are 5 lumbar type vertebral bodies.  Normal alignment of the lumbar spine.  No spondylolysis or spondylolisthesis. No scoliosis.  No compression fracture.    Normal marrow signal is identified within the visualized bony   structures.  No discrete marrow lesion.     SACRUM:  Normal signal within the sacrum. No evidence of insufficiency or stress fracture.     DISTAL CORD AND CONUS:  Normal size and signal within the distal cord and conus.     PARASPINAL SOFT TISSUES:  Paraspinal soft tissues are unremarkable.     LOWER THORACIC DISC SPACES:  Normal disc height and signal.  No disc herniation, canal stenosis or foraminal narrowing.     LUMBAR DISC SPACES:     L1-L2:  Normal.     L2-L3:  Normal.     L3-L4:  Normal.     L4-L5: Mild disc desiccation with preserved disc height. Diffuse disc bulge and bilateral facet arthrosis. Tiny superimposed central disc protrusion. No significant stenosis.     L5-S1: Disc desiccation with mild loss of disc height and Modic type II endplate change. Disc osteophyte complex with bilateral facet arthrosis resulting in mild bilateral foraminal narrowing. Central canal remains patent.     OTHER FINDINGS:  None.     IMPRESSION:     Mild L4-5 and L5-S1 spondylotic changes noted as above.              No orders to display       No orders of the defined types were placed in this encounter.

## 2024-03-21 ENCOUNTER — OFFICE VISIT (OUTPATIENT)
Dept: URGENT CARE | Facility: CLINIC | Age: 39
End: 2024-03-21
Payer: COMMERCIAL

## 2024-03-21 VITALS
RESPIRATION RATE: 18 BRPM | OXYGEN SATURATION: 98 % | HEART RATE: 78 BPM | SYSTOLIC BLOOD PRESSURE: 119 MMHG | TEMPERATURE: 98 F | DIASTOLIC BLOOD PRESSURE: 77 MMHG
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
## 2024-03-21 PROCEDURE — 99213 OFFICE O/P EST LOW 20 MIN: CPT

## 2024-03-21 RX ORDER — LORATADINE 10 MG/1
10 TABLET ORAL DAILY
Qty: 21 TABLET | Refills: 0 | Status: SHIPPED | OUTPATIENT
Start: 2024-03-21

## 2024-03-21 RX ORDER — TRIAMCINOLONE ACETONIDE 1 MG/G
CREAM TOPICAL 2 TIMES DAILY
Qty: 15 G | Refills: 0 | Status: SHIPPED | OUTPATIENT
Start: 2024-03-21

## 2024-03-21 NOTE — PROGRESS NOTES
[facility]        NAME: Leonila Burden is a 39 y.o. female  : 1985    MRN: [id number]  DATE: 2024  TIME: 2:07 PM    Assessment and Plan   Bedbug bite, initial encounter [W57.XXXA]  1. Bedbug bite, initial encounter  triamcinolone (KENALOG) 0.1 % cream    loratadine (CLARITIN) 10 mg tablet        Red rash on left forearm. Pt provides photo of bug (bed bug).notes she already fumigated. Only noted 1 bug    Patient Instructions     Use the cream as directed. Mild soap and water.  Take claritin daily  Follow up with PCP in 3-5 days.  Proceed to  ER if symptoms worsen.    Chief Complaint     Chief Complaint   Patient presents with    Rash         History of Present Illness       Pt is a 39 year old female who presents today for rash to left forearm. She notes that her mother brought some items home from [employer] and she noticed a bed bug in the cupholder of her couch. Her mother was taking it outside to throw it out but must have dropped in on her computer desk.. she felt a bite to the left arm yesterday but thought it was her sweater until she saw the bug again. Rash to left wrist and forearm area red in nature and itchy. Pt presents with photo of bug,.        Review of Systems   Review of Systems   Skin:  Positive for rash.   All other systems reviewed and are negative.        Current Medications       Current Outpatient Medications:     loratadine (CLARITIN) 10 mg tablet, Take 1 tablet (10 mg total) by mouth daily, Disp: 21 tablet, Rfl: 0    triamcinolone (KENALOG) 0.1 % cream, Apply topically 2 (two) times a day, Disp: 15 g, Rfl: 0    methylPREDNISolone 4 MG tablet therapy pack, Use as directed on package, Disp: 1 each, Rfl: 0    metoprolol succinate (TOPROL-XL) 25 mg 24 hr tablet, Take 25 mg by mouth daily (Patient not taking: Reported on 2024), Disp: , Rfl:     Misc. Devices (Carex Coccyx Cushion) MISC, Use 3 (three) times a day, Disp: 1 each, Rfl: 0    naproxen (Naprosyn)  500 mg tablet, Take 1 tablet (500 mg total) by mouth 2 (two) times a day with meals, Disp: 14 tablet, Rfl: 0    nicotine (NICODERM CQ) 14 mg/24hr TD 24 hr patch, Place 1 patch on the skin every 24 hours (Patient not taking: Reported on 2024), Disp: , Rfl:     nicotine polacrilex (COMMIT) 4 MG lozenge, Use one lozenge every 2 to 3 hours as needed for a nicotine craving. (Patient not taking: Reported on 2024), Disp: , Rfl:     vitamin B-12 (VITAMIN B-12) 1,000 mcg tablet, Take 1,000 mcg by mouth daily, Disp: , Rfl:     Current Allergies     Allergies as of 2024    (No Known Allergies)            The following portions of the patient's history were reviewed and updated as appropriate: allergies, current medications, past family history, past medical history, past social history, past surgical history and problem list.     Past Medical History:   Diagnosis Date    Afib (HCC)     Anxiety        Past Surgical History:   Procedure Laterality Date     SECTION      CHOLECYSTECTOMY         History reviewed. No pertinent family history.      Medications have been verified.        Objective   /77   Pulse 78   Temp 98 °F (36.7 °C)   Resp 18   LMP 2024 (Approximate)   SpO2 98%        Physical Exam     Physical Exam  Vitals and nursing note reviewed.   Constitutional:       Appearance: Normal appearance. She is normal weight.   Cardiovascular:      Rate and Rhythm: Normal rate and regular rhythm.      Pulses: Normal pulses.   Pulmonary:      Effort: Pulmonary effort is normal.   Skin:     Capillary Refill: Capillary refill takes less than 2 seconds.      Findings: Rash present. Rash is macular and papular.          Neurological:      Mental Status: She is alert.

## 2024-04-29 ENCOUNTER — HOSPITAL ENCOUNTER (EMERGENCY)
Facility: HOSPITAL | Age: 39
Discharge: HOME | End: 2024-04-29
Attending: EMERGENCY MEDICINE
Payer: COMMERCIAL

## 2024-04-29 VITALS
HEART RATE: 89 BPM | HEIGHT: 69 IN | SYSTOLIC BLOOD PRESSURE: 116 MMHG | BODY MASS INDEX: 36.88 KG/M2 | WEIGHT: 249 LBS | RESPIRATION RATE: 18 BRPM | DIASTOLIC BLOOD PRESSURE: 70 MMHG | OXYGEN SATURATION: 98 % | TEMPERATURE: 97.5 F

## 2024-04-29 DIAGNOSIS — L03.116 CELLULITIS OF LEFT LOWER EXTREMITY: Primary | ICD-10-CM

## 2024-04-29 DIAGNOSIS — M79.89 LEFT LEG SWELLING: ICD-10-CM

## 2024-04-29 PROCEDURE — 99282 EMERGENCY DEPT VISIT SF MDM: CPT

## 2024-04-29 PROCEDURE — 99284 EMERGENCY DEPT VISIT MOD MDM: CPT | Performed by: EMERGENCY MEDICINE

## 2024-04-29 RX ORDER — CEPHALEXIN 500 MG/1
500 CAPSULE ORAL EVERY 6 HOURS SCHEDULED
Qty: 28 CAPSULE | Refills: 0 | Status: SHIPPED | OUTPATIENT
Start: 2024-04-29 | End: 2024-05-06

## 2024-04-29 RX ORDER — CEPHALEXIN 250 MG/1
500 CAPSULE ORAL ONCE
Status: COMPLETED | OUTPATIENT
Start: 2024-04-29 | End: 2024-04-29

## 2024-04-29 RX ADMIN — CEPHALEXIN 500 MG: 250 CAPSULE ORAL at 23:12

## 2024-04-30 NOTE — ED PROVIDER NOTES
History  Chief Complaint   Patient presents with    Leg Swelling     Patient states that she has left leg swelling for the past 3 days. Has hx of same however she now has a red rash on left calf denies itching or warmth.      Leonila Burden is a 39 y.o. year old female with PMH of chronic lower extremity edema presenting to the [facility] ED for left leg rash. Patient reports chronic, ongoing history of bilateral lower extremity edema. Patient has previously been evaluated by orthopedics and underwent vascular duplex which was negative for DVT.  Patient presents to the ED this evening due to redness which developed on the posterior aspect of her left leg this evening.  Patient stating that her pain and swelling are unchanged from baseline which has been ongoing for months.  Swelling/discomfort radiates from the left hip all the way down to the left foot.  Patient denies fever/chills.  No weakness/numbness in the lower extremity.  Patient denies known injuries to the left lower extremity. Denies Hx of MRSA infection.       History provided by:  Medical records and patient   used: No        Prior to Admission Medications   Prescriptions Last Dose Informant Patient Reported? Taking?   Misc. Devices (Carex Coccyx Cushion) MISC   No No   Sig: Use 3 (three) times a day   loratadine (CLARITIN) 10 mg tablet   No No   Sig: Take 1 tablet (10 mg total) by mouth daily   methylPREDNISolone 4 MG tablet therapy pack  Self No No   Sig: Use as directed on package   metoprolol succinate (TOPROL-XL) 25 mg 24 hr tablet  Self Yes No   Sig: Take 25 mg by mouth daily   Patient not taking: Reported on 1/9/2024   naproxen (Naprosyn) 500 mg tablet  Self No No   Sig: Take 1 tablet (500 mg total) by mouth 2 (two) times a day with meals   nicotine (NICODERM CQ) 14 mg/24hr TD 24 hr patch  Self Yes No   Sig: Place 1 patch on the skin every 24 hours   Patient not taking: Reported on 1/9/2024   nicotine polacrilex (COMMIT) 4 MG  lozenge  Self Yes No   Sig: Use one lozenge every 2 to 3 hours as needed for a nicotine craving.   Patient not taking: Reported on 2024   triamcinolone (KENALOG) 0.1 % cream   No No   Sig: Apply topically 2 (two) times a day   vitamin B-12 (VITAMIN B-12) 1,000 mcg tablet  Self Yes No   Sig: Take 1,000 mcg by mouth daily      Facility-Administered Medications: None       Past Medical History:   Diagnosis Date    Afib (HCC)     Anxiety        Past Surgical History:   Procedure Laterality Date     SECTION      CHOLECYSTECTOMY         History reviewed. No pertinent family history.  I have reviewed and agree with the history as documented.    E-Cigarette/Vaping    E-Cigarette Use Current Every Day User      E-Cigarette/Vaping Substances    Nicotine No     Flavoring Yes      Social History     Tobacco Use    Smoking status: Former     Current packs/day: 0.00     Types: Cigarettes     Quit date: 2024     Years since quittin.3     Passive exposure: Never    Smokeless tobacco: Never   Vaping Use    Vaping status: Every Day    Substances: Flavoring   Substance Use Topics    Alcohol use: Not Currently    Drug use: Never       Review of Systems   Constitutional:  Negative for chills and fever.   Respiratory:  Negative for shortness of breath.    Cardiovascular:  Positive for leg swelling. Negative for chest pain.   Gastrointestinal:  Negative for abdominal pain, nausea and vomiting.   Musculoskeletal:  Positive for myalgias.   Skin:  Positive for rash and wound.   Neurological:  Negative for weakness and numbness.   All other systems reviewed and are negative.      Physical Exam  Physical Exam  Vitals and nursing note reviewed.   Constitutional:       General: She is not in acute distress.     Appearance: Normal appearance. She is well-developed. She is not ill-appearing, toxic-appearing or diaphoretic.   HENT:      Head: Normocephalic and atraumatic.      Nose: No congestion or rhinorrhea.   Eyes:       General:         Right eye: No discharge.         Left eye: No discharge.   Cardiovascular:      Rate and Rhythm: Normal rate and regular rhythm.   Pulmonary:      Effort: Pulmonary effort is normal. No accessory muscle usage or respiratory distress.      Breath sounds: Normal breath sounds. No stridor. No decreased breath sounds, wheezing, rhonchi or rales.   Abdominal:      General: There is no distension.      Palpations: Abdomen is soft.      Tenderness: There is no abdominal tenderness. There is no guarding or rebound.   Musculoskeletal:      Cervical back: Normal range of motion and neck supple.      Right knee: No swelling. Normal range of motion. No tenderness.      Left knee: No swelling. Normal range of motion. No tenderness.      Right lower leg: No tenderness. Edema present.      Left lower leg: Tenderness present. Edema present.      Right ankle: Swelling present. No tenderness. Normal range of motion.      Left ankle: Swelling present. No tenderness. Normal range of motion.      Right foot: Normal range of motion. No swelling, tenderness or bony tenderness.      Left foot: Normal range of motion. Swelling present. No tenderness or bony tenderness.   Skin:     Capillary Refill: Capillary refill takes less than 2 seconds.      Findings: Erythema and rash present. Rash is not scaling, urticarial or vesicular.   Neurological:      Mental Status: She is alert and oriented to person, place, and time.   Psychiatric:         Mood and Affect: Mood normal.         Behavior: Behavior normal.         Vital Signs  ED Triage Vitals [04/29/24 2248]   Temperature Pulse Respirations Blood Pressure SpO2   97.5 °F (36.4 °C) 89 18 116/70 98 %      Temp Source Heart Rate Source Patient Position - Orthostatic VS BP Location FiO2 (%)   Oral Monitor Sitting Right arm --      Pain Score       8           Vitals:    04/29/24 2248   BP: 116/70   Pulse: 89   Patient Position - Orthostatic VS: Sitting         Visual Acuity      ED  Medications  Medications   cephalexin (KEFLEX) capsule 500 mg (500 mg Oral Given 4/29/24 0892)       Diagnostic Studies  Results Reviewed       None                   No orders to display              Procedures  Procedures         ED Course                               SBIRT 20yo+      Flowsheet Row Most Recent Value   Initial Alcohol Screen: US AUDIT-C     1. How often do you have a drink containing alcohol? 0 Filed at: 04/29/2024 2248   3a. Male UNDER 65: How often do you have five or more drinks on one occasion? 0 Filed at: 04/29/2024 2248   3b. FEMALE Any Age, or MALE 65+: How often do you have 4 or more drinks on one occassion? 0 Filed at: 04/29/2024 2248   Audit-C Score 0 Filed at: 04/29/2024 2248   KARLENE: How many times in the past year have you...    Used an illegal drug or used a prescription medication for non-medical reasons? Never Filed at: 04/29/2024 2248                      Medical Decision Making    39 y.o. female presenting for evaluation of left leg rash.  Patient has chronic lower extremity edema for which she was previously evaluated by orthopedics.  Patient previously underwent lower extremity vascular duplex which was negative for DVT.  Patient states swelling and discomfort are unchanged from baseline.  Main concern is skin changes and redness in the posterior aspect of the left leg.  Symptoms may be due to stasis dermatitis however given appearance will cover with course of cephalexin for cellulitis.    Patient previously recommended for evaluation by vascular surgery given chronic lower extremity edema and I have encouraged her to follow-up on these recommendations.    I have discussed with the patient our plan to discharge them from the ED and the patient is in agreement with this plan. The patient was provided a written after visit summary with strict RTED precautions.     Discharge Plan: Prescription for cephalexin, encouraged compression stockings.    Followup: I have discussed with the  patient plan to follow up with their PCP. Contact information provided in AVS.    Risk  Prescription drug management.             Disposition  Final diagnoses:   Cellulitis of left lower extremity   Left leg swelling     Time reflects when diagnosis was documented in both MDM as applicable and the Disposition within this note       Time User Action Codes Description Comment    4/29/2024 11:06 PM Ken Melchor [L03.116] Cellulitis of left lower extremity     4/29/2024 11:06 PM Ken Melchor [M79.89] Left leg swelling           ED Disposition       ED Disposition   Discharge    Condition   Stable    Date/Time   Mon Apr 29, 2024 5322    Comment   Leonila Burden discharge to home/self care.                   Follow-up Information       Follow up With Specialties Details Why Contact Info    Marc Ibarra,  Internal Medicine Schedule an appointment as soon as possible for a visit  To make appointment for reevaluation in 3-5 days. [address] [place] [id number]  [phone]              Discharge Medication List as of 4/29/2024 11:07 PM        START taking these medications    Details   cephalexin (KEFLEX) 500 mg capsule Take 1 capsule (500 mg total) by mouth every 6 (six) hours for 7 days, Starting Mon 4/29/2024, Until Mon 5/6/2024, Normal           CONTINUE these medications which have NOT CHANGED    Details   loratadine (CLARITIN) 10 mg tablet Take 1 tablet (10 mg total) by mouth daily, Starting Thu 3/21/2024, Normal      methylPREDNISolone 4 MG tablet therapy pack Use as directed on package, Normal      metoprolol succinate (TOPROL-XL) 25 mg 24 hr tablet Take 25 mg by mouth daily, Starting Thu 12/7/2023, Historical Med      Misc. Devices (Carex Coccyx Cushion) MISC Use 3 (three) times a day, Starting Wed 3/13/2024, Print      naproxen (Naprosyn) 500 mg tablet Take 1 tablet (500 mg total) by mouth 2 (two) times a day with meals, Starting Wed 1/3/2024, Normal      nicotine (NICODERM CQ) 14  mg/24hr TD 24 hr patch Place 1 patch on the skin every 24 hours, Starting Wed 9/6/2023, Historical Med      nicotine polacrilex (COMMIT) 4 MG lozenge Use one lozenge every 2 to 3 hours as needed for a nicotine craving., Historical Med      triamcinolone (KENALOG) 0.1 % cream Apply topically 2 (two) times a day, Starting Thu 3/21/2024, Normal      vitamin B-12 (VITAMIN B-12) 1,000 mcg tablet Take 1,000 mcg by mouth daily, Starting Wed 1/10/2024, Historical Med             No discharge procedures on file.    PDMP Review       None            ED Provider  Electronically Signed by             Ken Melchor DO  04/30/24 1464

## 2024-04-30 NOTE — DISCHARGE INSTRUCTIONS
You have been seen for left leg rash. Please complete the course of cephalexin as prescribed. Return to the emergency department if you develop worsening pain, redness, fevers, weakness/numbness or any other symptoms of concern. Please follow up with your PCP by calling the number provided.

## 2024-05-14 ENCOUNTER — OFFICE VISIT (OUTPATIENT)
Dept: OBGYN CLINIC | Facility: CLINIC | Age: 39
End: 2024-05-14
Payer: COMMERCIAL

## 2024-05-14 VITALS — WEIGHT: 249 LBS | HEIGHT: 67 IN | BODY MASS INDEX: 39.08 KG/M2

## 2024-05-14 DIAGNOSIS — M79.89 LEFT LEG SWELLING: Primary | ICD-10-CM

## 2024-05-14 PROCEDURE — 99214 OFFICE O/P EST MOD 30 MIN: CPT | Performed by: STUDENT IN AN ORGANIZED HEALTH CARE EDUCATION/TRAINING PROGRAM

## 2024-05-14 NOTE — PROGRESS NOTES
Orthopedic Surgery Office Note  Leonila Burden (39 y.o. female)   : 1985   MRN: [id number]   Encounter Date: 2024 with Dr. Boogie Lane,   Chief Complaint   Patient presents with    Left Ankle - Follow-up    Left Hip - Follow-up       Assessment, Plan, & Discussion:     Left leg pain/swelling/edema  Discussed with the patient that:  - MRI of left ankle is negative for internal derangement as a course for ankle pain/swelling  - XR of lumbar spine with narrowing and possibility of neurogenic edema  - Duplex was negative as a source for unilateral LLE swelling  - Follow up with Dr. Frey for review of MRI and consideration of injection that was previously discussed  - At this moment patient has no direct ankle orthopedic evidence for her recurrent swelling and pain.  She may have an element of CRPS but does not have a specific trauma to this area.  full workup for how she could have unilateral swelling and lower extremity pain without trauma or DV performed, vascular/medical states patient has venous insufficiency w incompetent valves. Symptoms have also significantly improved with compression.   - explained that due to no orthopedic surgical necessity, she does not need to follow-up  - continue with treatment from PCP and pain management  - The patient expresses understanding and is in agreement with today's treatment plan.       Plan:   Return if symptoms worsen or fail to improve.    Surgery:   No surgery planned at this time      Orders:     There are no diagnoses linked to this encounter.       History of Present Illness:     Leonila Burden is a 39 y.o. female with history of afib, not on blood thinners, who presents for follow up regarding left leg swelling and pain.   Minimal calf pain, no complaints at baseline.    Describes 2 years of problems with her left leg from hip to knee to now foot.     She states that she has recently been seen in the ED for cellulitis and left leg edema  on 4/29/24. She was instructed to wear compression socks, which she notes has helped significantly    She was seen by Dr. Frey at pain management and was offered an injection. She states that she declined due to not wanting anything in her system that is not natural. She states that she previously had long-term side effects from an epidural when she gave birth to her twins.    Denies cold/shiny feeling or apperarance. Prior duplex in September, describes not the same symptoms, but continued pain of the knee. Swelling is new since November 1 year prior; resolved on its own and now returned.       Prior HPI:   Leonila Burden is a 38 y.o. female who presents for consultation following visit to the [facility], here for orthopedic follow up regarding swelling of the left foot. Patient reports that she noticed the swelling in both feet but that went away. She states that now she is only experiencing the swelling in her left foot as of 1/3-1/4/2024. Patient has taken Naproxen at night that does not help with the swelling. She has tried heating pads that help with the pain at night. She has tried elevation for multiple hours since the onset of swelling that does not decrease the swelling.     On presentation today, she reports that she is still having pain and swelling in the left ankle. She has been attending physical therapy but has had to cut back on exercises due to swelling and pain. She does report that overall she is doing well. She states that the only treatment that helps the ankle/foot swelling is elevation while at home. She reports that with the start of physical therapy, she has started to experience left knee pain that has flared up.    Review of Systems  Constitutional: Negative for fatigue, fever or loss of appetite.   HENT: Negative.    Respiratory: Negative for shortness of breath, dyspnea.    Cardiovascular: Negative for chest pain/tightness.   Gastrointestinal: Negative for abdominal pain, N/V.  "  Endocrine: Negative for cold/heat intolerance, unexplained weight loss/gain.   Genitourinary: Negative for flank pain, dysuria  Skin: Negative for rash.    Psychiatric/Behavioral: Negative for agitation.  All else negative unless otherwise noted in HPI    Physical Exam:   General:  Ht 5' 7\" (1.702 m)   Wt 113 kg (249 lb)   LMP 04/15/2024 (Approximate)   BMI 39.00 kg/m²   Cons: Appears well.  No apparent distress.  Psych: Alert. Oriented x3.  Mood and affect normal.  Eyes: PERRLA, EOMI  Resp: Normal effort.  No audible wheezing or stridor.  CV: Extremities warm and well perfused.   Abd:    No distention or guarding.   Skin: Warm. No visible lesions.  Neuro: Normal muscle tone.      Orthopedic Exam:   left ankle -   Patient ambulates with steady gait pattern  Uses No assistive device  No anatomical deformity  Skin is warm and dry to touch with no signs of erythema, ecchymosis, infection  Mild generalized swelling, significantly improved since last visit  ROM WNL in PF, DF, INV and EV  No TTP on exam  MMT: 4/5 throughout  No anterior drawer  - syndesmotic squeeze  Calf compartments are soft and supple  Intact pulses with brisk capillary refill to the toes  Sensation to light touch intact distally      Imaging/Studies:     Study: Duplex Venous Insufficiency Bilateral LE  Date: 5/6/24  Report: I have read and agree with the radiologist report.  I have personally reviewed imaging and my impression is as follows:   There is no evidence of deep vein thrombosis in the bilateral lower   extremities.     There is no evidence of deep venous reflux in the right lower extremity.     There is no evidence of  small saphenous vein reflux in the right lower   extremity.     There is no evidence of  great saphenous vein reflux in the left lower   extremity.     Left deep venous reflux is identified in the common femoral vein.     Great saphenous vein reflux is identified in the right lower extremity.     Small saphenous vein " reflux is identified in the left lower extremity.       Study: MRI lumbar spine [facility]  Date: 3/5/24  Report: I have read and agree with the radiologist report.  I have personally reviewed imaging and my impression is as follows:    LUMBAR DISC SPACES:     L1-L2:  Normal.     L2-L3:  Normal.     L3-L4:  Normal.     L4-L5: Mild disc desiccation with preserved disc height. Diffuse disc bulge and bilateral facet arthrosis. Tiny superimposed central disc protrusion. No significant stenosis.     L5-S1: Disc desiccation with mild loss of disc height and Modic type II endplate change. Disc osteophyte complex with bilateral facet arthrosis resulting in mild bilateral foraminal narrowing. Central canal remains patent.     OTHER FINDINGS:  None.     IMPRESSION:     Mild L4-5 and L5-S1 spondylotic changes noted as above.    Study: VAS Venous Duplex- lower limb unilateral  Date: 2/16/2024  Report: I have read and agree with radiologist report.  CONCLUSION:     Impression:  RIGHT LOWER LIMB LIMITED:  Evaluation shows no evidence of thrombus in the common femoral vein.  Doppler evaluation shows a normal response to augmentation maneuvers.     LEFT LOWER LIMB:  No evidence of acute or chronic deep vein thrombosis  No evidence of superficial thrombophlebitis noted.  Doppler evaluation shows a normal response to augmentation maneuvers.  Popliteal, posterior tibial and anterior tibial arterial Doppler waveforms are  triphasic.      Study: XR lumbar spine  Date: 1/23/24   Report: No radiologist report available. I have personally reviewed all imaging. My impression is as follows:  IMPRESSION:  X-ray shows possible foramenal narrowing of L5-S1. Mild degenerative changes throughout     Study: MRI left ankle  Date: 1/18/24  Report: I have read and agree with the radiologist report.  I have personally reviewed imaging and my impression is as follows:   Subcutaneous Tissues: Diffuse subcutaneous edema without localized fluid  collection.  Joint Effusion: None.  TENDONS:  Achilles tendon: Normal.  Peroneus brevis and longus: Normal.  Posterior tibialis, flexor digitorum longus, flexor hallucis longus: Normal.  Anterior tibialis, extensor digitorum longus, extensor hallucis longus:  Normal.  LIGAMENTS:  Lateral collateral ligament complex:  Normal.  Distal tibiofibular syndesmosis:  Normal.  Medial collateral ligament complex:  Normal.    Study: XR left foot  Date: 2024  Report: No radiologist report was available at this time.   I have personally reviewed imaging and my impression is as follows:   FINDINGS:  There is no acute fracture or dislocation.  No significant degenerative changes.  No lytic or blastic osseous lesion.  Soft tissues are unremarkable.  IMPRESSION:  No acute osseous abnormality.     Study: VAS Duplex US Lower Extremity - Left Leg  Date: 2022  Report: I have read and agree with the radiologist report. My impression is as follows:  IMPRESSION:  No evidence of deep vein thrombosis in the left leg    Procedures  No procedures today.      Medical, Surgical, Family, and Social History    The patient's medical history, family history, and social history, were reviewed and updated as appropriate.    Past Medical History:   Diagnosis Date    Afib (HCC)     Anxiety      Past Surgical History:   Procedure Laterality Date     SECTION      CHOLECYSTECTOMY       History reviewed. No pertinent family history.  Social History     Occupational History    Not on file   Tobacco Use    Smoking status: Former     Current packs/day: 0.00     Types: Cigarettes     Quit date: 2024     Years since quittin.3     Passive exposure: Never    Smokeless tobacco: Never   Vaping Use    Vaping status: Every Day    Substances: Flavoring   Substance and Sexual Activity    Alcohol use: Not Currently    Drug use: Never    Sexual activity: Yes     Partners: Male     No Known Allergies    Current Outpatient Medications:      loratadine (CLARITIN) 10 mg tablet, Take 1 tablet (10 mg total) by mouth daily, Disp: 21 tablet, Rfl: 0    methylPREDNISolone 4 MG tablet therapy pack, Use as directed on package, Disp: 1 each, Rfl: 0    Misc. Devices (Carex Coccyx Cushion) MISC, Use 3 (three) times a day, Disp: 1 each, Rfl: 0    naproxen (Naprosyn) 500 mg tablet, Take 1 tablet (500 mg total) by mouth 2 (two) times a day with meals, Disp: 14 tablet, Rfl: 0    triamcinolone (KENALOG) 0.1 % cream, Apply topically 2 (two) times a day, Disp: 15 g, Rfl: 0    vitamin B-12 (VITAMIN B-12) 1,000 mcg tablet, Take 1,000 mcg by mouth daily, Disp: , Rfl:     metoprolol succinate (TOPROL-XL) 25 mg 24 hr tablet, Take 25 mg by mouth daily (Patient not taking: Reported on 5/14/2024), Disp: , Rfl:     nicotine (NICODERM CQ) 14 mg/24hr TD 24 hr patch, Place 1 patch on the skin every 24 hours (Patient not taking: Reported on 1/9/2024), Disp: , Rfl:     nicotine polacrilex (COMMIT) 4 MG lozenge, Use one lozenge every 2 to 3 hours as needed for a nicotine craving. (Patient not taking: Reported on 1/9/2024), Disp: , Rfl:       This Visit:     30 minutes was spent in the coordination of care, reviewing of imaging and with the patient on the date of service    Scribe Attestation      I,:  Crissy Washington am acting as a scribe while in the presence of the attending physician.:       I,:  Boogie Lane DO personally performed the services described in this documentation    as scribed in my presence.:               Dr. Boogie Lane DO, Orthopedic Surgeon  [facility]

## 2024-09-18 ENCOUNTER — OFFICE VISIT (OUTPATIENT)
Dept: URGENT CARE | Facility: CLINIC | Age: 39
End: 2024-09-18
Payer: COMMERCIAL

## 2024-09-18 VITALS
RESPIRATION RATE: 18 BRPM | DIASTOLIC BLOOD PRESSURE: 70 MMHG | SYSTOLIC BLOOD PRESSURE: 112 MMHG | HEART RATE: 82 BPM | OXYGEN SATURATION: 99 % | TEMPERATURE: 97.7 F

## 2024-09-18 DIAGNOSIS — I87.2 VENOUS STASIS DERMATITIS: Primary | ICD-10-CM

## 2024-09-18 PROCEDURE — G0382 LEV 3 HOSP TYPE B ED VISIT: HCPCS

## 2024-09-18 RX ORDER — MOMETASONE FUROATE 1 MG/G
CREAM TOPICAL DAILY
Qty: 45 G | Refills: 0 | Status: SHIPPED | OUTPATIENT
Start: 2024-09-18

## 2024-09-18 NOTE — PROGRESS NOTES
[facility]        NAME: Leonila Burden is a 39 y.o. female  : 1985    MRN: [id number]  DATE: 2024  TIME: 9:29 AM    Assessment and Plan   Venous stasis dermatitis [I87.2]  1. Venous stasis dermatitis  mometasone (ELOCON) 0.1 % cream        Will trial mometason for discoloration.  Fatigue is likely from 15 hour shifts.     Patient Instructions   Trial the steroid cream once daily    Follow up with PCP in 3-5 days.  Proceed to  ER if symptoms worsen.    Chief Complaint     Chief Complaint   Patient presents with    Leg Pain     Bilateral leg pain          History of Present Illness       Patient is a 39 year old female who presents to the office today for pain and discoloration of bilateral lower extremities. She has had issues for a while. 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 she was wearing 15mmHg compression socks. She notes that there is now redness to bilateral inner lower legs and pain. She did take half a naproxen which seemed to help with pain and swelling. Was at work today and it started to bother her more so she left. She has had a full work up in the past for both legs         Review of Systems   Review of Systems   Constitutional:  Negative for appetite change, chills and fever.   Cardiovascular:  Positive for leg swelling.   Skin:  Positive for color change.   All other systems reviewed and are negative.        Current Medications       Current Outpatient Medications:     mometasone (ELOCON) 0.1 % cream, Apply topically daily, Disp: 45 g, Rfl: 0    naproxen (Naprosyn) 500 mg tablet, Take 1 tablet (500 mg total) by mouth 2 (two) times a day with meals, Disp: 14 tablet, Rfl: 0    vitamin B-12 (VITAMIN B-12) 1,000 mcg tablet, Take 1,000 mcg by mouth daily, Disp: , Rfl:     loratadine (CLARITIN) 10 mg tablet, Take 1 tablet (10 mg total) by mouth daily, Disp: 21 tablet, Rfl: 0    methylPREDNISolone 4 MG tablet therapy pack, Use as  directed on package, Disp: 1 each, Rfl: 0    metoprolol succinate (TOPROL-XL) 25 mg 24 hr tablet, Take 25 mg by mouth daily (Patient not taking: Reported on 2024), Disp: , Rfl:     Misc. Devices (Carex Coccyx Cushion) MISC, Use 3 (three) times a day, Disp: 1 each, Rfl: 0    nicotine (NICODERM CQ) 14 mg/24hr TD 24 hr patch, Place 1 patch on the skin every 24 hours (Patient not taking: Reported on 2024), Disp: , Rfl:     nicotine polacrilex (COMMIT) 4 MG lozenge, Use one lozenge every 2 to 3 hours as needed for a nicotine craving. (Patient not taking: Reported on 2024), Disp: , Rfl:     triamcinolone (KENALOG) 0.1 % cream, Apply topically 2 (two) times a day, Disp: 15 g, Rfl: 0    Current Allergies     Allergies as of 2024    (No Known Allergies)            The following portions of the patient's history were reviewed and updated as appropriate: allergies, current medications, past family history, past medical history, past social history, past surgical history and problem list.     Past Medical History:   Diagnosis Date    Afib (HCC)     Anxiety        Past Surgical History:   Procedure Laterality Date     SECTION      CHOLECYSTECTOMY         History reviewed. No pertinent family history.      Medications have been verified.        Objective   /70   Pulse 82   Temp 97.7 °F (36.5 °C)   Resp 18   LMP 2024 (Approximate)   SpO2 99%        Physical Exam     Physical Exam  Vitals and nursing note reviewed.   Constitutional:       Appearance: Normal appearance. She is normal weight.   Cardiovascular:      Rate and Rhythm: Normal rate and regular rhythm.      Pulses: Normal pulses.      Heart sounds: Normal heart sounds.   Pulmonary:      Effort: Pulmonary effort is normal.      Breath sounds: Normal breath sounds.   Skin:     General: Skin is warm.      Capillary Refill: Capillary refill takes less than 2 seconds.      Findings: Erythema present.             Comments: Areas of  rash are consistent with patient wearing compression socks. No unilateral leg swelling noted.   Neurological:      Mental Status: She is alert.

## 2024-09-18 NOTE — LETTER
September 18, 2024     Patient: Leonila Burden   YOB: 1985   Date of Visit: 9/18/2024       To Whom It May Concern:    It is my medical opinion that Leonila Burden may return to work on 9/20/2024 or 9/19/2024 if symptoms improve sooner .    If you have any questions or concerns, please don't hesitate to call.         Sincerely,        TR White    CC: No Recipients

## 2024-11-04 ENCOUNTER — APPOINTMENT (OUTPATIENT)
Dept: RADIOLOGY | Facility: CLINIC | Age: 39
End: 2024-11-04
Payer: COMMERCIAL

## 2024-11-04 ENCOUNTER — OFFICE VISIT (OUTPATIENT)
Dept: URGENT CARE | Facility: CLINIC | Age: 39
End: 2024-11-04
Payer: COMMERCIAL

## 2024-11-04 VITALS
RESPIRATION RATE: 18 BRPM | HEART RATE: 86 BPM | DIASTOLIC BLOOD PRESSURE: 80 MMHG | TEMPERATURE: 98 F | OXYGEN SATURATION: 99 % | SYSTOLIC BLOOD PRESSURE: 124 MMHG

## 2024-11-04 DIAGNOSIS — R07.81 RIB PAIN ON LEFT SIDE: ICD-10-CM

## 2024-11-04 DIAGNOSIS — R07.81 RIB PAIN ON LEFT SIDE: Primary | ICD-10-CM

## 2024-11-04 PROCEDURE — 71046 X-RAY EXAM CHEST 2 VIEWS: CPT

## 2024-11-04 PROCEDURE — G0382 LEV 3 HOSP TYPE B ED VISIT: HCPCS

## 2024-11-04 NOTE — PROGRESS NOTES
[facility]        NAME: Leonila Burden is a 39 y.o. female  : 1985    MRN: [id number]  DATE: 2024  TIME: 4:45 PM    Assessment and Plan   Rib pain on left side [R07.81]  1. Rib pain on left side  XR chest pa and lateral        Pt requesting cxr for pain. No abnormalities noted on xray  Symptoms appear muscular in nature. Recommend supportive care.    Patient Instructions   No acute abnormalities on xray  May try some muscle rub to the area. May also apply heat to the area but not at the same time.  May use tylenol and ibuprofen as needed for pain  Trial gas x for burping.    Follow up with PCP in 3-5 days.  Proceed to  ER if symptoms worsen.    Chief Complaint     Chief Complaint   Patient presents with    Cold Like Symptoms     Right sided back pain.  Hurts with inspiration and movement.  Started last night.  Not taking any medication for pain.  Did not apply ice/heat.  States she burps when bending to the right side.  Unable to relieve the pain except with lying on left side.  Does not recall injury         History of Present Illness       Patient is a 39 year old female who presents to the office today for left sided pain that started last night. Pain with deep breathing and with moving to the right causes her to burp. Denies fever, cough, congestion. Son was sick with pneumonia recently.         Review of Systems   Review of Systems   Constitutional:  Negative for fever.   HENT:  Negative for congestion, postnasal drip and rhinorrhea.    Respiratory:  Negative for cough, shortness of breath and wheezing.    Cardiovascular:  Negative for chest pain and palpitations.   Musculoskeletal:  Positive for back pain.   All other systems reviewed and are negative.        Current Medications     No current outpatient medications on file.    Current Allergies     Allergies as of 2024    (No Known Allergies)            The following portions of the patient's history were reviewed and  updated as appropriate: allergies, current medications, past family history, past medical history, past social history, past surgical history and problem list.     Past Medical History:   Diagnosis Date    Afib (HCC)     Anxiety        Past Surgical History:   Procedure Laterality Date     SECTION      CHOLECYSTECTOMY         History reviewed. No pertinent family history.      Medications have been verified.        Objective   /80 (BP Location: Left arm)   Pulse 86   Temp 98 °F (36.7 °C) (Skin)   Resp 18   SpO2 99%        Physical Exam     Physical Exam  Vitals and nursing note reviewed.   Constitutional:       Appearance: Normal appearance. She is normal weight.   Cardiovascular:      Rate and Rhythm: Normal rate and regular rhythm.      Pulses: Normal pulses.      Heart sounds: Normal heart sounds.   Pulmonary:      Effort: Pulmonary effort is normal.      Breath sounds: Normal breath sounds.   Chest:       Skin:     General: Skin is warm.      Capillary Refill: Capillary refill takes less than 2 seconds.   Neurological:      Mental Status: She is alert.

## 2024-11-04 NOTE — PATIENT INSTRUCTIONS
No acute abnormalities on xray  May try some muscle rub to the area. May also apply heat to the area but not at the same time.  May use tylenol and ibuprofen as needed for pain  Trial gas x for burping.

## 2024-11-18 ENCOUNTER — OFFICE VISIT (OUTPATIENT)
Dept: URGENT CARE | Facility: CLINIC | Age: 39
End: 2024-11-18
Payer: COMMERCIAL

## 2024-11-18 ENCOUNTER — APPOINTMENT (OUTPATIENT)
Dept: RADIOLOGY | Facility: CLINIC | Age: 39
End: 2024-11-18
Payer: COMMERCIAL

## 2024-11-18 VITALS
RESPIRATION RATE: 18 BRPM | HEIGHT: 69 IN | OXYGEN SATURATION: 97 % | WEIGHT: 224 LBS | DIASTOLIC BLOOD PRESSURE: 74 MMHG | TEMPERATURE: 97.8 F | HEART RATE: 87 BPM | BODY MASS INDEX: 33.18 KG/M2 | SYSTOLIC BLOOD PRESSURE: 134 MMHG

## 2024-11-18 DIAGNOSIS — J18.9 PNEUMONIA OF LEFT LUNG DUE TO INFECTIOUS ORGANISM, UNSPECIFIED PART OF LUNG: Primary | ICD-10-CM

## 2024-11-18 DIAGNOSIS — R05.1 ACUTE COUGH: ICD-10-CM

## 2024-11-18 PROCEDURE — G0382 LEV 3 HOSP TYPE B ED VISIT: HCPCS

## 2024-11-18 PROCEDURE — 71046 X-RAY EXAM CHEST 2 VIEWS: CPT

## 2024-11-18 RX ORDER — DOXYCYCLINE HYCLATE 100 MG
100 TABLET ORAL 2 TIMES DAILY
Qty: 14 TABLET | Refills: 0 | Status: SHIPPED | OUTPATIENT
Start: 2024-11-18 | End: 2024-11-25

## 2024-11-18 RX ORDER — HYDROCHLOROTHIAZIDE 12.5 MG/1
12.5 CAPSULE ORAL
COMMUNITY
Start: 2024-09-23

## 2024-11-18 RX ORDER — ALBUTEROL SULFATE 0.83 MG/ML
2.5 SOLUTION RESPIRATORY (INHALATION) EVERY 6 HOURS PRN
Qty: 240 ML | Refills: 0 | Status: SHIPPED | OUTPATIENT
Start: 2024-11-18

## 2024-11-18 RX ORDER — PREDNISONE 20 MG/1
40 TABLET ORAL DAILY
Qty: 10 TABLET | Refills: 0 | Status: SHIPPED | OUTPATIENT
Start: 2024-11-18 | End: 2024-11-23

## 2024-11-18 NOTE — PROGRESS NOTES
[facility]        NAME: Leonila Burden is a 39 y.o. female  : 1985    MRN: [id number]  DATE: 2024  TIME: 5:59 PM    Assessment and Plan   Pneumonia of left lung due to infectious organism, unspecified part of lung [J18.9]  1. Pneumonia of left lung due to infectious organism, unspecified part of lung  XR chest pa and lateral    Nebulizer    albuterol (2.5 mg/3 mL) 0.083 % nebulizer solution    doxycycline hyclate (VIBRA-TABS) 100 mg tablet    predniSONE 20 mg tablet        Chest x-ray concerning of pneumonia on the left side.  Will treat with doxycycline.  Prednisone for shortness of breath.  Nebulizer with albuterol solution provided as well.    Patient Instructions     Pneumonia on the left side.  Take the antibiotics as directed.  Use the nebulizer as prescribed.  Steroids once daily for 5 days.  Follow up with PCP in 3-5 days.  Proceed to  ER if symptoms worsen.    Chief Complaint     Chief Complaint   Patient presents with    Cough     Cough for a week. Had a fever yesterday. Worried she has pneumonia.         History of Present Illness       Patient is a 39 year old female who presents to the office today for a cough and congestion since last . Son was sick recently. Getting worse. Now cough makes her want to vomit. Also has fever. Fever gone for 2 days.     Cough  Associated symptoms include chills, a fever, shortness of breath and wheezing.       Review of Systems   Review of Systems   Constitutional:  Positive for chills, fatigue and fever.   HENT:  Positive for congestion.    Respiratory:  Positive for cough, shortness of breath and wheezing.    Psychiatric/Behavioral:  Positive for sleep disturbance.    All other systems reviewed and are negative.        Current Medications       Current Outpatient Medications:     albuterol (2.5 mg/3 mL) 0.083 % nebulizer solution, Take 3 mL (2.5 mg total) by nebulization every 6 (six) hours as needed for wheezing or shortness of  "breath, Disp: 240 mL, Rfl: 0    doxycycline hyclate (VIBRA-TABS) 100 mg tablet, Take 1 tablet (100 mg total) by mouth 2 (two) times a day for 7 days, Disp: 14 tablet, Rfl: 0    predniSONE 20 mg tablet, Take 2 tablets (40 mg total) by mouth daily for 5 days, Disp: 10 tablet, Rfl: 0    hydroCHLOROthiazide 12.5 mg capsule, Take 12.5 mg by mouth (Patient not taking: Reported on 2024), Disp: , Rfl:     Current Allergies     Allergies as of 2024    (No Known Allergies)            The following portions of the patient's history were reviewed and updated as appropriate: allergies, current medications, past family history, past medical history, past social history, past surgical history and problem list.     Past Medical History:   Diagnosis Date    Afib (HCC)     Anxiety        Past Surgical History:   Procedure Laterality Date     SECTION      CHOLECYSTECTOMY         Family History   Problem Relation Age of Onset    Diabetes Mother     Hypertension Mother     Stroke Father          Medications have been verified.        Objective   /74   Pulse 87   Temp 97.8 °F (36.6 °C)   Resp 18   Ht 5' 9\" (1.753 m)   Wt 102 kg (224 lb)   SpO2 97%   BMI 33.08 kg/m²        Physical Exam     Physical Exam  Vitals and nursing note reviewed.   Constitutional:       General: She is not in acute distress.     Appearance: She is ill-appearing. She is not toxic-appearing.   HENT:      Right Ear: Tympanic membrane normal.      Left Ear: Tympanic membrane normal.      Nose: Congestion present.      Mouth/Throat:      Mouth: Mucous membranes are moist.   Cardiovascular:      Rate and Rhythm: Normal rate and regular rhythm.      Pulses: Normal pulses.      Heart sounds: Normal heart sounds.   Pulmonary:      Effort: Pulmonary effort is normal.      Breath sounds: Wheezing and rales (bl) present.   Skin:     General: Skin is warm.      Capillary Refill: Capillary refill takes less than 2 seconds.   Neurological:      " Mental Status: She is alert.

## 2024-11-18 NOTE — LETTER
November 18, 2024     Patient: Leonila Burden   YOB: 1985   Date of Visit: 11/18/2024       To Whom It May Concern:    It is my medical opinion that Leonila Burden may return to work on 11/21/2024 .    If you have any questions or concerns, please don't hesitate to call.         Sincerely,        TR White    CC: No Recipients

## 2024-11-18 NOTE — PATIENT INSTRUCTIONS
Pneumonia on the left side.  Take the antibiotics as directed.  Use the nebulizer as prescribed.  Steroids once daily for 5 days.

## 2024-12-07 ENCOUNTER — HOSPITAL ENCOUNTER (EMERGENCY)
Facility: HOSPITAL | Age: 39
Discharge: HOME | End: 2024-12-07
Attending: EMERGENCY MEDICINE | Admitting: EMERGENCY MEDICINE
Payer: COMMERCIAL

## 2024-12-07 ENCOUNTER — APPOINTMENT (EMERGENCY)
Dept: RADIOLOGY | Facility: HOSPITAL | Age: 39
End: 2024-12-07
Payer: COMMERCIAL

## 2024-12-07 VITALS
DIASTOLIC BLOOD PRESSURE: 64 MMHG | HEART RATE: 66 BPM | WEIGHT: 224.87 LBS | RESPIRATION RATE: 20 BRPM | BODY MASS INDEX: 33.21 KG/M2 | TEMPERATURE: 98.3 F | SYSTOLIC BLOOD PRESSURE: 100 MMHG | OXYGEN SATURATION: 97 %

## 2024-12-07 DIAGNOSIS — R07.89 CHEST WALL PAIN: Primary | ICD-10-CM

## 2024-12-07 DIAGNOSIS — R09.1 PLEURISY: ICD-10-CM

## 2024-12-07 LAB
ALBUMIN SERPL BCG-MCNC: 4.6 G/DL (ref 3.5–5)
ALP SERPL-CCNC: 67 U/L (ref 34–104)
ALT SERPL W P-5'-P-CCNC: 14 U/L (ref 7–52)
ANION GAP SERPL CALCULATED.3IONS-SCNC: 10 MMOL/L (ref 4–13)
AST SERPL W P-5'-P-CCNC: 13 U/L (ref 13–39)
BASOPHILS # BLD AUTO: 0.03 THOUSANDS/ÂΜL (ref 0–0.1)
BASOPHILS NFR BLD AUTO: 0 % (ref 0–1)
BILIRUB SERPL-MCNC: 0.52 MG/DL (ref 0.2–1)
BUN SERPL-MCNC: 9 MG/DL (ref 5–25)
CALCIUM SERPL-MCNC: 9.3 MG/DL (ref 8.4–10.2)
CARDIAC TROPONIN I PNL SERPL HS: <2 NG/L (ref ?–50)
CHLORIDE SERPL-SCNC: 105 MMOL/L (ref 96–108)
CO2 SERPL-SCNC: 23 MMOL/L (ref 21–32)
CREAT SERPL-MCNC: 0.6 MG/DL (ref 0.6–1.3)
EOSINOPHIL # BLD AUTO: 0 THOUSAND/ÂΜL (ref 0–0.61)
EOSINOPHIL NFR BLD AUTO: 0 % (ref 0–6)
ERYTHROCYTE [DISTWIDTH] IN BLOOD BY AUTOMATED COUNT: 11.9 % (ref 11.6–15.1)
GFR SERPL CREATININE-BSD FRML MDRD: 115 ML/MIN/1.73SQ M
GLUCOSE SERPL-MCNC: 146 MG/DL (ref 65–140)
HCT VFR BLD AUTO: 38.2 % (ref 34.8–46.1)
HGB BLD-MCNC: 12.3 G/DL (ref 11.5–15.4)
IMM GRANULOCYTES # BLD AUTO: 0.01 THOUSAND/UL (ref 0–0.2)
IMM GRANULOCYTES NFR BLD AUTO: 0 % (ref 0–2)
LIPASE SERPL-CCNC: 13 U/L (ref 11–82)
LYMPHOCYTES # BLD AUTO: 1.11 THOUSANDS/ÂΜL (ref 0.6–4.47)
LYMPHOCYTES NFR BLD AUTO: 14 % (ref 14–44)
MCH RBC QN AUTO: 28.4 PG (ref 26.8–34.3)
MCHC RBC AUTO-ENTMCNC: 32.2 G/DL (ref 31.4–37.4)
MCV RBC AUTO: 88 FL (ref 82–98)
MONOCYTES # BLD AUTO: 0.24 THOUSAND/ÂΜL (ref 0.17–1.22)
MONOCYTES NFR BLD AUTO: 3 % (ref 4–12)
NEUTROPHILS # BLD AUTO: 6.4 THOUSANDS/ÂΜL (ref 1.85–7.62)
NEUTS SEG NFR BLD AUTO: 83 % (ref 43–75)
NRBC BLD AUTO-RTO: 0 /100 WBCS
PLATELET # BLD AUTO: 318 THOUSANDS/UL (ref 149–390)
PMV BLD AUTO: 10.4 FL (ref 8.9–12.7)
POTASSIUM SERPL-SCNC: 3.7 MMOL/L (ref 3.5–5.3)
PROT SERPL-MCNC: 7.6 G/DL (ref 6.4–8.4)
RBC # BLD AUTO: 4.33 MILLION/UL (ref 3.81–5.12)
SODIUM SERPL-SCNC: 138 MMOL/L (ref 135–147)
WBC # BLD AUTO: 7.79 THOUSAND/UL (ref 4.31–10.16)

## 2024-12-07 PROCEDURE — 80053 COMPREHEN METABOLIC PANEL: CPT | Performed by: EMERGENCY MEDICINE

## 2024-12-07 PROCEDURE — 99285 EMERGENCY DEPT VISIT HI MDM: CPT

## 2024-12-07 PROCEDURE — 84484 ASSAY OF TROPONIN QUANT: CPT | Performed by: EMERGENCY MEDICINE

## 2024-12-07 PROCEDURE — 85025 COMPLETE CBC W/AUTO DIFF WBC: CPT | Performed by: EMERGENCY MEDICINE

## 2024-12-07 PROCEDURE — 93005 ELECTROCARDIOGRAM TRACING: CPT

## 2024-12-07 PROCEDURE — 83690 ASSAY OF LIPASE: CPT | Performed by: EMERGENCY MEDICINE

## 2024-12-07 PROCEDURE — 71045 X-RAY EXAM CHEST 1 VIEW: CPT

## 2024-12-07 PROCEDURE — 36415 COLL VENOUS BLD VENIPUNCTURE: CPT | Performed by: EMERGENCY MEDICINE

## 2024-12-07 PROCEDURE — 99284 EMERGENCY DEPT VISIT MOD MDM: CPT | Performed by: EMERGENCY MEDICINE

## 2024-12-07 PROCEDURE — 96374 THER/PROPH/DIAG INJ IV PUSH: CPT

## 2024-12-07 RX ORDER — IBUPROFEN 600 MG/1
600 TABLET, FILM COATED ORAL EVERY 6 HOURS PRN
Qty: 30 TABLET | Refills: 0 | Status: SHIPPED | OUTPATIENT
Start: 2024-12-07

## 2024-12-07 RX ORDER — KETOROLAC TROMETHAMINE 30 MG/ML
15 INJECTION, SOLUTION INTRAMUSCULAR; INTRAVENOUS ONCE
Status: COMPLETED | OUTPATIENT
Start: 2024-12-07 | End: 2024-12-07

## 2024-12-07 RX ADMIN — KETOROLAC TROMETHAMINE 15 MG: 30 INJECTION, SOLUTION INTRAMUSCULAR at 11:00

## 2024-12-07 NOTE — ED PROVIDER NOTES
Time reflects when diagnosis was documented in both MDM as applicable and the Disposition within this note       Time User Action Codes Description Comment    12/7/2024 12:22 PM Boogie Box Add [R07.89] Chest wall pain     12/7/2024 12:31 PM Boogie Box Add [R09.1] Pleurisy           ED Disposition       ED Disposition   Discharge    Condition   Stable    Date/Time   Sat Dec 7, 2024 12:22 PM    Comment   Leonila Berrtam discharge to home/self care.                   Assessment & Plan       Medical Decision Making  40 y/o F presenting with left posterior chest wall pain. Worse with inspiration and movement. Has been present for approximately 3 to 5 days but worsening this morning.  Worsened with any type of inspiration and coughing.  Patient was treated for a left-sided pneumonia on 11/28/2024.     Problems Addressed:  Chest wall pain: acute illness or injury  Pleurisy: acute illness or injury    Amount and/or Complexity of Data Reviewed  Labs: ordered. Decision-making details documented in ED Course.  Radiology: ordered and independent interpretation performed. Decision-making details documented in ED Course.  ECG/medicine tests: ordered and independent interpretation performed. Decision-making details documented in ED Course.    Risk  OTC drugs.  Prescription drug management.  Decision regarding hospitalization.  Risk Details: I personally discussed return precautions with this patient and family. I provided the patient with written discharge instructions and particularly highlighted specific areas of interest to this patient, including but not limited to: medications for symptom managment, follow up recommendations, and return precautions. Patient and family are in agreement with this plan as outlined above.  Patient is not tachycardic tachypneic nor hypoxic.  There is no calf tenderness or asymmetry.  No evidence for sepsis, severe sepsis or septic shock.               Medications   ketorolac  (TORADOL) injection 15 mg (15 mg Intravenous Given 24 1100)       ED Risk Strat Scores   HEART Risk Score      Flowsheet Row Most Recent Value   Heart Score Risk Calculator    History 0 Filed at: 2024 1211   ECG 0 Filed at: 2024 1211   Age 0 Filed at: 2024 1211   Risk Factors 1 Filed at: 2024 1211   Troponin 0 Filed at: 2024 1211   HEART Score 1 Filed at: 2024 1211                               SBIRT 20yo+      Flowsheet Row Most Recent Value   Initial Alcohol Screen: US AUDIT-C     3b. FEMALE Any Age, or MALE 65+: How often do you have 4 or more drinks on one occassion? 0 Filed at: 2024 1039   Audit-C Score 0 Filed at: 2024 1039   KARLENE: How many times in the past year have you...    Used an illegal drug or used a prescription medication for non-medical reasons? Never Filed at: 2024 1039                            History of Present Illness       Chief Complaint   Patient presents with    Chest Pain     Pt diagnosed with pneumonia 1 month ago and now has left sided chest pain and unable to take breath in       Past Medical History:   Diagnosis Date    Afib (HCC)     Anxiety       Past Surgical History:   Procedure Laterality Date     SECTION      CHOLECYSTECTOMY        Family History   Problem Relation Age of Onset    Diabetes Mother     Hypertension Mother     Stroke Father       Social History     Tobacco Use    Smoking status: Former     Current packs/day: 0.00     Types: Cigarettes     Quit date: 2024     Years since quittin.9     Passive exposure: Never    Smokeless tobacco: Never   Vaping Use    Vaping status: Every Day    Substances: Flavoring   Substance Use Topics    Alcohol use: Not Currently     Comment: social    Drug use: Never      E-Cigarette/Vaping    E-Cigarette Use Current Every Day User       E-Cigarette/Vaping Substances    Nicotine No     Flavoring Yes       I have reviewed and agree with the history as documented.     39  y/o F presenting with left posterior chest wall pain. Worse with inspiration and movement. Has been present for approximately 3 to 5 days but worsening this morning.  Worsened with any type of inspiration and coughing.  Patient was treated for a left-sided pneumonia on 11/28/2024.  Started on doxycycline, prednisone and albuterol.  States she has had to spread out her medications due to difficulty tolerating all the medications at 1 time.  Denies fevers or chills.  Denies any shortness of breath.  Denies any headaches or rash.  Denies any palpitations or racing heart.  Denies any calf pain tenderness or asymmetry.      Chest Pain  Associated symptoms: cough    Associated symptoms: no abdominal pain, no back pain, no dizziness, no fatigue, no fever, no headache, no nausea, no palpitations, no shortness of breath and not vomiting        Review of Systems   Constitutional:  Negative for appetite change, chills, fatigue, fever and unexpected weight change.   HENT:  Negative for congestion, ear pain, rhinorrhea and sore throat.    Eyes:  Negative for pain and visual disturbance.   Respiratory:  Positive for cough. Negative for chest tightness, shortness of breath and wheezing.    Cardiovascular:  Positive for chest pain. Negative for palpitations and leg swelling.   Gastrointestinal:  Negative for abdominal pain, constipation, diarrhea, nausea and vomiting.   Genitourinary:  Negative for difficulty urinating, dysuria, frequency, hematuria, menstrual problem, pelvic pain, vaginal bleeding and vaginal discharge.   Musculoskeletal:  Negative for arthralgias, back pain and neck pain.   Skin:  Negative for color change and rash.   Neurological:  Negative for dizziness, seizures, syncope, light-headedness and headaches.   Psychiatric/Behavioral:  Negative for sleep disturbance.    All other systems reviewed and are negative.          Objective       ED Triage Vitals [12/07/24 1037]   Temperature Pulse Blood Pressure  Respirations SpO2 Patient Position - Orthostatic VS   98.3 °F (36.8 °C) 80 113/68 20 97 % Sitting      Temp Source Heart Rate Source BP Location FiO2 (%) Pain Score    Temporal Monitor Left arm -- 9      Vitals      Date and Time Temp Pulse SpO2 Resp BP Pain Score FACES Pain Rating User   12/07/24 1201 -- 66 97 % 20 100/64 -- -- LD   12/07/24 1132 -- 70 98 % -- 104/70 -- -- LD   12/07/24 1117 -- -- -- 21 -- -- -- LD   12/07/24 1104 -- 71 97 % 21 113/68 -- -- LD   12/07/24 1100 -- -- -- -- -- 9 -- LD   12/07/24 1037 98.3 °F (36.8 °C) 80 97 % 20 113/68 9 -- KTR            Physical Exam  Vitals and nursing note reviewed.   Constitutional:       General: She is not in acute distress.     Appearance: Normal appearance. She is well-developed and normal weight. She is not ill-appearing, toxic-appearing or diaphoretic.   HENT:      Head: Normocephalic and atraumatic.      Nose: Nose normal.      Mouth/Throat:      Mouth: Mucous membranes are moist.      Pharynx: Oropharynx is clear.   Eyes:      General: No scleral icterus.     Extraocular Movements: Extraocular movements intact.      Conjunctiva/sclera: Conjunctivae normal.   Cardiovascular:      Rate and Rhythm: Normal rate and regular rhythm.      Pulses: Normal pulses.      Heart sounds: Normal heart sounds. No murmur heard.     No friction rub. No gallop.   Pulmonary:      Effort: Pulmonary effort is normal. No respiratory distress.      Breath sounds: Normal breath sounds. No decreased breath sounds, wheezing, rhonchi or rales.   Abdominal:      Palpations: Abdomen is soft. There is no mass.      Tenderness: There is no abdominal tenderness. There is no right CVA tenderness, left CVA tenderness, guarding or rebound.      Hernia: No hernia is present.   Musculoskeletal:         General: No swelling. Normal range of motion.      Cervical back: Normal range of motion and neck supple. No rigidity or tenderness.      Right lower leg: No tenderness. No edema.      Left  lower leg: No tenderness. No edema.   Lymphadenopathy:      Cervical: No cervical adenopathy.   Skin:     General: Skin is warm and dry.      Capillary Refill: Capillary refill takes less than 2 seconds.      Coloration: Skin is not jaundiced or pale.      Findings: No lesion or rash.   Neurological:      General: No focal deficit present.      Mental Status: She is alert and oriented to person, place, and time.   Psychiatric:         Mood and Affect: Mood normal.         Behavior: Behavior normal.         Results Reviewed       Procedure Component Value Units Date/Time    HS Troponin I 4hr [[id number]]     Lab Status: No result Specimen: Blood     HS Troponin I 2hr [[id number]]     Lab Status: No result Specimen: Blood     HS Troponin 0hr (reflex protocol) [[id number]]  (Normal) Collected: 12/07/24 1102    Lab Status: Final result Specimen: Blood from Arm, Right Updated: 12/07/24 1157     hs TnI 0hr <2 ng/L     Comprehensive metabolic panel [[id number]]  (Abnormal) Collected: 12/07/24 1102    Lab Status: Final result Specimen: Blood from Arm, Right Updated: 12/07/24 1150     Sodium 138 mmol/L      Potassium 3.7 mmol/L      Chloride 105 mmol/L      CO2 23 mmol/L      ANION GAP 10 mmol/L      BUN 9 mg/dL      Creatinine 0.60 mg/dL      Glucose 146 mg/dL      Calcium 9.3 mg/dL      AST 13 U/L      ALT 14 U/L      Alkaline Phosphatase 67 U/L      Total Protein 7.6 g/dL      Albumin 4.6 g/dL      Total Bilirubin 0.52 mg/dL      eGFR 115 ml/min/1.73sq m     Narrative:      National Kid[employer] Foundation guidelines for Chronic Kidney Disease (CKD):     Stage 1 with normal or high GFR (GFR > 90 mL/min/1.73 square meters)    Stage 2 Mild CKD (GFR = 60-89 mL/min/1.73 square meters)    Stage 3A Moderate CKD (GFR = 45-59 mL/min/1.73 square meters)    Stage 3B Moderate CKD (GFR = 30-44 mL/min/1.73 square meters)    Stage 4 Severe CKD (GFR = 15-29 mL/min/1.73 square meters)    Stage 5 End Stage CKD (GFR <15 mL/min/1.73 square  meters)  Note: GFR calculation is accurate only with a steady state creatinine    Lipase [[id number]]  (Normal) Collected: 12/07/24 1102    Lab Status: Final result Specimen: Blood from Arm, Right Updated: 12/07/24 1150     Lipase 13 u/L     CBC and differential [[id number]]  (Abnormal) Collected: 12/07/24 1102    Lab Status: Final result Specimen: Blood from Arm, Right Updated: 12/07/24 1133     WBC 7.79 Thousand/uL      RBC 4.33 Million/uL      Hemoglobin 12.3 g/dL      Hematocrit 38.2 %      MCV 88 fL      MCH 28.4 pg      MCHC 32.2 g/dL      RDW 11.9 %      MPV 10.4 fL      Platelets 318 Thousands/uL      nRBC 0 /100 WBCs      Segmented % 83 %      Immature Grans % 0 %      Lymphocytes % 14 %      Monocytes % 3 %      Eosinophils Relative 0 %      Basophils Relative 0 %      Absolute Neutrophils 6.40 Thousands/µL      Absolute Immature Grans 0.01 Thousand/uL      Absolute Lymphocytes 1.11 Thousands/µL      Absolute Monocytes 0.24 Thousand/µL      Eosinophils Absolute 0.00 Thousand/µL      Basophils Absolute 0.03 Thousands/µL             XR chest 1 view portable    (Results Pending)       ECG 12 Lead Documentation Only    Date/Time: 12/7/2024 11:00 AM    Performed by: Boogie Box DO  Authorized by: Boogie Box DO    Indications / Diagnosis:  Chest wall pain  ECG reviewed by me, the ED Provider: yes    Patient location:  ED  Previous ECG:     Comparison to cardiac monitor: Yes    Rate:     ECG rate:  76    ECG rate assessment: normal    Rhythm:     Rhythm: sinus rhythm    Ectopy:     Ectopy: none    QRS:     QRS axis:  Normal    QRS intervals:  Normal  Conduction:     Conduction: normal    ST segments:     ST segments:  Normal  T waves:     T waves: non-specific        ED Medication and Procedure Management   Prior to Admission Medications   Prescriptions Last Dose Informant Patient Reported? Taking?   albuterol (2.5 mg/3 mL) 0.083 % nebulizer solution   No No   Sig: Take 3 mL (2.5 mg  total) by nebulization every 6 (six) hours as needed for wheezing or shortness of breath   hydroCHLOROthiazide 12.5 mg capsule   Yes No   Sig: Take 12.5 mg by mouth   Patient not taking: Reported on 11/18/2024      Facility-Administered Medications: None     Discharge Medication List as of 12/7/2024 12:31 PM        START taking these medications    Details   ibuprofen (MOTRIN) 600 mg tablet Take 1 tablet (600 mg total) by mouth every 6 (six) hours as needed for moderate pain, Starting Sat 12/7/2024, Normal           CONTINUE these medications which have NOT CHANGED    Details   albuterol (2.5 mg/3 mL) 0.083 % nebulizer solution Take 3 mL (2.5 mg total) by nebulization every 6 (six) hours as needed for wheezing or shortness of breath, Starting Mon 11/18/2024, Normal      hydroCHLOROthiazide 12.5 mg capsule Take 12.5 mg by mouth, Starting Mon 9/23/2024, Historical Med           No discharge procedures on file.  ED SEPSIS DOCUMENTATION   Time reflects when diagnosis was documented in both MDM as applicable and the Disposition within this note       Time User Action Codes Description Comment    12/7/2024 12:22 PM Boogie Box Add [R07.89] Chest wall pain     12/7/2024 12:31 PM Boogie Box Add [R09.1] Pleurisy                  Boogie Box,   12/07/24 1314

## 2024-12-16 LAB
ATRIAL RATE: 77 BPM
P AXIS: 64 DEGREES
PR INTERVAL: 188 MS
QRS AXIS: 60 DEGREES
QRSD INTERVAL: 88 MS
QT INTERVAL: 394 MS
QTC INTERVAL: 445 MS
T WAVE AXIS: 38 DEGREES
VENTRICULAR RATE: 77 BPM

## 2024-12-16 PROCEDURE — 93010 ELECTROCARDIOGRAM REPORT: CPT | Performed by: INTERNAL MEDICINE

## 2024-12-18 PROBLEM — R05.1 ACUTE COUGH: Status: RESOLVED | Noted: 2024-11-18 | Resolved: 2024-12-18

## 2025-04-01 ENCOUNTER — APPOINTMENT (EMERGENCY)
Dept: RADIOLOGY | Facility: HOSPITAL | Age: 40
End: 2025-04-01
Payer: COMMERCIAL

## 2025-04-01 ENCOUNTER — APPOINTMENT (EMERGENCY)
Dept: NON INVASIVE DIAGNOSTICS | Facility: HOSPITAL | Age: 40
End: 2025-04-01
Payer: COMMERCIAL

## 2025-04-01 ENCOUNTER — HOSPITAL ENCOUNTER (EMERGENCY)
Facility: HOSPITAL | Age: 40
Discharge: HOME | End: 2025-04-01
Attending: EMERGENCY MEDICINE
Payer: COMMERCIAL

## 2025-04-01 ENCOUNTER — APPOINTMENT (EMERGENCY)
Dept: CT IMAGING | Facility: HOSPITAL | Age: 40
End: 2025-04-01
Payer: COMMERCIAL

## 2025-04-01 ENCOUNTER — OFFICE VISIT (OUTPATIENT)
Dept: URGENT CARE | Facility: CLINIC | Age: 40
End: 2025-04-01
Payer: COMMERCIAL

## 2025-04-01 VITALS
SYSTOLIC BLOOD PRESSURE: 131 MMHG | BODY MASS INDEX: 33.82 KG/M2 | HEART RATE: 76 BPM | OXYGEN SATURATION: 97 % | TEMPERATURE: 98.9 F | WEIGHT: 229 LBS | RESPIRATION RATE: 16 BRPM | DIASTOLIC BLOOD PRESSURE: 75 MMHG

## 2025-04-01 VITALS
BODY MASS INDEX: 34.48 KG/M2 | SYSTOLIC BLOOD PRESSURE: 95 MMHG | DIASTOLIC BLOOD PRESSURE: 55 MMHG | HEART RATE: 68 BPM | RESPIRATION RATE: 17 BRPM | TEMPERATURE: 98.5 F | WEIGHT: 233.47 LBS | OXYGEN SATURATION: 97 %

## 2025-04-01 DIAGNOSIS — R91.1 PULMONARY NODULE: Primary | ICD-10-CM

## 2025-04-01 DIAGNOSIS — R07.9 CHEST PAIN: ICD-10-CM

## 2025-04-01 DIAGNOSIS — R07.9 CHEST PAIN, UNSPECIFIED TYPE: Primary | ICD-10-CM

## 2025-04-01 LAB
ALBUMIN SERPL BCG-MCNC: 4.3 G/DL (ref 3.5–5)
ALP SERPL-CCNC: 60 U/L (ref 34–104)
ALT SERPL W P-5'-P-CCNC: 13 U/L (ref 7–52)
ANION GAP SERPL CALCULATED.3IONS-SCNC: 7 MMOL/L (ref 4–13)
AST SERPL W P-5'-P-CCNC: 13 U/L (ref 13–39)
BASOPHILS # BLD AUTO: 0.04 THOUSANDS/ÂΜL (ref 0–0.1)
BASOPHILS NFR BLD AUTO: 1 % (ref 0–1)
BILIRUB SERPL-MCNC: 0.39 MG/DL (ref 0.2–1)
BNP SERPL-MCNC: 22 PG/ML (ref 0–100)
BUN SERPL-MCNC: 13 MG/DL (ref 5–25)
CALCIUM SERPL-MCNC: 9.4 MG/DL (ref 8.4–10.2)
CARDIAC TROPONIN I PNL SERPL HS: <2 NG/L (ref ?–50)
CARDIAC TROPONIN I PNL SERPL HS: <2 NG/L (ref ?–50)
CHLORIDE SERPL-SCNC: 103 MMOL/L (ref 96–108)
CO2 SERPL-SCNC: 29 MMOL/L (ref 21–32)
CREAT SERPL-MCNC: 0.6 MG/DL (ref 0.6–1.3)
D DIMER PPP FEU-MCNC: 0.71 UG/ML FEU
EOSINOPHIL # BLD AUTO: 0.09 THOUSAND/ÂΜL (ref 0–0.61)
EOSINOPHIL NFR BLD AUTO: 1 % (ref 0–6)
ERYTHROCYTE [DISTWIDTH] IN BLOOD BY AUTOMATED COUNT: 12.2 % (ref 11.6–15.1)
GFR SERPL CREATININE-BSD FRML MDRD: 114 ML/MIN/1.73SQ M
GLUCOSE SERPL-MCNC: 91 MG/DL (ref 65–140)
HCT VFR BLD AUTO: 35.9 % (ref 34.8–46.1)
HGB BLD-MCNC: 11.7 G/DL (ref 11.5–15.4)
IMM GRANULOCYTES # BLD AUTO: 0.01 THOUSAND/UL (ref 0–0.2)
IMM GRANULOCYTES NFR BLD AUTO: 0 % (ref 0–2)
LYMPHOCYTES # BLD AUTO: 2.08 THOUSANDS/ÂΜL (ref 0.6–4.47)
LYMPHOCYTES NFR BLD AUTO: 28 % (ref 14–44)
MCH RBC QN AUTO: 29 PG (ref 26.8–34.3)
MCHC RBC AUTO-ENTMCNC: 32.6 G/DL (ref 31.4–37.4)
MCV RBC AUTO: 89 FL (ref 82–98)
MONOCYTES # BLD AUTO: 0.53 THOUSAND/ÂΜL (ref 0.17–1.22)
MONOCYTES NFR BLD AUTO: 7 % (ref 4–12)
NEUTROPHILS # BLD AUTO: 4.76 THOUSANDS/ÂΜL (ref 1.85–7.62)
NEUTS SEG NFR BLD AUTO: 63 % (ref 43–75)
NRBC BLD AUTO-RTO: 0 /100 WBCS
PLATELET # BLD AUTO: 312 THOUSANDS/UL (ref 149–390)
PMV BLD AUTO: 9.8 FL (ref 8.9–12.7)
POTASSIUM SERPL-SCNC: 3.4 MMOL/L (ref 3.5–5.3)
PROT SERPL-MCNC: 7.3 G/DL (ref 6.4–8.4)
RBC # BLD AUTO: 4.03 MILLION/UL (ref 3.81–5.12)
SODIUM SERPL-SCNC: 139 MMOL/L (ref 135–147)
WBC # BLD AUTO: 7.51 THOUSAND/UL (ref 4.31–10.16)

## 2025-04-01 PROCEDURE — 71045 X-RAY EXAM CHEST 1 VIEW: CPT

## 2025-04-01 PROCEDURE — 99213 OFFICE O/P EST LOW 20 MIN: CPT

## 2025-04-01 PROCEDURE — 83880 ASSAY OF NATRIURETIC PEPTIDE: CPT | Performed by: PHYSICIAN ASSISTANT

## 2025-04-01 PROCEDURE — 93971 EXTREMITY STUDY: CPT

## 2025-04-01 PROCEDURE — 93005 ELECTROCARDIOGRAM TRACING: CPT

## 2025-04-01 PROCEDURE — 36415 COLL VENOUS BLD VENIPUNCTURE: CPT | Performed by: PHYSICIAN ASSISTANT

## 2025-04-01 PROCEDURE — 99285 EMERGENCY DEPT VISIT HI MDM: CPT | Performed by: PHYSICIAN ASSISTANT

## 2025-04-01 PROCEDURE — 80053 COMPREHEN METABOLIC PANEL: CPT | Performed by: PHYSICIAN ASSISTANT

## 2025-04-01 PROCEDURE — 85379 FIBRIN DEGRADATION QUANT: CPT | Performed by: PHYSICIAN ASSISTANT

## 2025-04-01 PROCEDURE — 93971 EXTREMITY STUDY: CPT | Performed by: SURGERY

## 2025-04-01 PROCEDURE — 84484 ASSAY OF TROPONIN QUANT: CPT | Performed by: PHYSICIAN ASSISTANT

## 2025-04-01 PROCEDURE — 99285 EMERGENCY DEPT VISIT HI MDM: CPT

## 2025-04-01 PROCEDURE — 71275 CT ANGIOGRAPHY CHEST: CPT

## 2025-04-01 PROCEDURE — 85025 COMPLETE CBC W/AUTO DIFF WBC: CPT | Performed by: PHYSICIAN ASSISTANT

## 2025-04-01 RX ADMIN — IOHEXOL 85 ML: 350 INJECTION, SOLUTION INTRAVENOUS at 19:52

## 2025-04-01 NOTE — PROGRESS NOTES
[facility]        NAME: Leonila Burden is a 40 y.o. female  : 1985    MRN: [id number]  DATE: 2025  TIME: 5:58 PM    Assessment and Plan   Chest pain, unspecified type [R07.9]  1. Chest pain, unspecified type  ECG 12 lead    Transfer to other facility        Ecg sinus arhythmia with PVCs.   Given chest pain with palpitations advise patient proceed to ED  Pt to go to [facility] ED.    Patient Instructions     Proceed to  ER    Chief Complaint     Chief Complaint   Patient presents with    Chest Pain     Chest pains with deep breath          History of Present Illness       Patient is a 40-year-old female who presents to the office today for chest pain that occurred around noon today at work.  She states that she also has palpitations since then.  Has a history of A-fib.  Does report some increased stress at home. Denies fever, cough, congestion.         Review of Systems   Review of Systems   Cardiovascular:  Positive for chest pain and palpitations.   All other systems reviewed and are negative.        Current Medications       Current Outpatient Medications:     albuterol (2.5 mg/3 mL) 0.083 % nebulizer solution, Take 3 mL (2.5 mg total) by nebulization every 6 (six) hours as needed for wheezing or shortness of breath, Disp: 240 mL, Rfl: 0    ibuprofen (MOTRIN) 600 mg tablet, Take 1 tablet (600 mg total) by mouth every 6 (six) hours as needed for moderate pain, Disp: 30 tablet, Rfl: 0    hydroCHLOROthiazide 12.5 mg capsule, Take 12.5 mg by mouth (Patient not taking: Reported on 2025), Disp: , Rfl:     Current Allergies     Allergies as of 2025    (No Known Allergies)            The following portions of the patient's history were reviewed and updated as appropriate: allergies, current medications, past family history, past medical history, past social history, past surgical history and problem list.     Past Medical History:   Diagnosis Date    Afib (HCC)     Anxiety        Past  Surgical History:   Procedure Laterality Date     SECTION      CHOLECYSTECTOMY         Family History   Problem Relation Age of Onset    Diabetes Mother     Hypertension Mother     Stroke Father          Medications have been verified.        Objective   /75   Pulse 76   Temp 98.9 °F (37.2 °C)   Resp 16   Wt 104 kg (229 lb)   LMP 2025 (Approximate)   SpO2 97%   BMI 33.82 kg/m²        Physical Exam     Physical Exam  Vitals and nursing note reviewed.   Constitutional:       Appearance: She is well-developed and normal weight.   Cardiovascular:      Rate and Rhythm: Normal rate and regular rhythm.      Heart sounds: Normal heart sounds.   Pulmonary:      Effort: Pulmonary effort is normal.      Breath sounds: Normal breath sounds.   Skin:     General: Skin is warm.      Capillary Refill: Capillary refill takes less than 2 seconds.   Neurological:      Mental Status: She is alert.

## 2025-04-01 NOTE — ED PROVIDER NOTES
Time reflects when diagnosis was documented in both MDM as applicable and the Disposition within this note       Time User Action Codes Description Comment    4/1/2025  8:52 PM Ken Hidalgo Add [R91.1] Pulmonary nodule     4/1/2025  8:55 PM Ken Hidalgo Add [R07.9] Chest pain           ED Disposition       ED Disposition   Discharge    Condition   Stable    Date/Time   Tue Apr 1, 2025  8:52 PM    Comment   Leonila Burden discharge to home/self care.                   Assessment & Plan       Medical Decision Making  40-year-old female here for evaluation of right-sided chest pain since around 12 noon today.     See HPI for further details differential diagnosis includes acute coronary syndrome, pneumothorax, pneumonia, pulmonary embolism    Troponin x 2 was normal, there is a pulmonary nodule noted on CTA she can have follow-up CT scan in 1 year.  This was discussed with the patient at bedside rule out malignancy.    Amount and/or Complexity of Data Reviewed  Labs: ordered. Decision-making details documented in ED Course.  Radiology: ordered and independent interpretation performed.    Risk  Prescription drug management.        ED Course as of 04/01/25 2058 Tue Apr 01, 2025   1806 SpO2: 97 %   1806 Respirations: 14   1806 Temp Source: Temporal   1806 Temperature: 98.1 °F (36.7 °C)   1806 Blood Pressure: 123/72   1834 Per ultrasound technologist there is no evidence of DVT, CBC also reviewed no leukocytosis anemia or thrombocytopenia.   2013 Delta trop collected, CTA interp pending, no gross PE noted on my wet read. Likely dc home after delta trop and cta     2041 hs TnI 2hr: <2   2041 Delta troponin normal canceled the 4-hour, awaiting CT interpretation   2050 IMPRESSION:     No pulmonary embolism.     Left upper lobe 5 mm pulmonary nodule. Based on current Fleischner Society 2017 Guidelines on incidental pulmonary nodule, no routine follow-up is needed if the patient is low risk. If the patient is  high risk, optional follow-up chest CT at 12 months   can be considered.     Mildly enlarged mediastinal and hilar lymph nodes, nonspecific.            Medications   iohexol (OMNIPAQUE) 350 MG/ML injection (MULTI-DOSE) 85 mL (85 mL Intravenous Given 4/1/25 1952)       ED Risk Strat Scores   HEART Risk Score      Flowsheet Row Most Recent Value   Heart Score Risk Calculator    History 0 Filed at: 04/01/2025 1819   ECG 0 Filed at: 04/01/2025 1819   Age 0 Filed at: 04/01/2025 1819   Risk Factors 1 Filed at: 04/01/2025 1819   Troponin 0 Filed at: 04/01/2025 1819   HEART Score 1 Filed at: 04/01/2025 1819          HEART Risk Score      Flowsheet Row Most Recent Value   Heart Score Risk Calculator    History 0 Filed at: 04/01/2025 1819   ECG 0 Filed at: 04/01/2025 1819   Age 0 Filed at: 04/01/2025 1819   Risk Factors 1 Filed at: 04/01/2025 1819   Troponin 0 Filed at: 04/01/2025 1819   HEART Score 1 Filed at: 04/01/2025 1819                              SBIRT 20yo+      Flowsheet Row Most Recent Value   Initial Alcohol Screen: US AUDIT-C     1. How often do you have a drink containing alcohol? 0 Filed at: 04/01/2025 1750   2. How many drinks containing alcohol do you have on a typical day you are drinking?  0 Filed at: 04/01/2025 1750   3a. Male UNDER 65: How often do you have five or more drinks on one occasion? 0 Filed at: 04/01/2025 1750   3b. FEMALE Any Age, or MALE 65+: How often do you have 4 or more drinks on one occassion? 0 Filed at: 04/01/2025 1750   Audit-C Score 0 Filed at: 04/01/2025 1750   KARLENE: How many times in the past year have you...    Used an illegal drug or used a prescription medication for non-medical reasons? Never Filed at: 04/01/2025 1750                            History of Present Illness       Chief Complaint   Patient presents with    Chest Pain     Pt states that she started with right sided chest tightness today around 1200         Past Medical History:   Diagnosis Date    Afib (HCC)      Anxiety       Past Surgical History:   Procedure Laterality Date     SECTION      CHOLECYSTECTOMY        Family History   Problem Relation Age of Onset    Diabetes Mother     Hypertension Mother     Stroke Father       Social History     Tobacco Use    Smoking status: Former     Current packs/day: 0.00     Types: Cigarettes     Quit date: 2024     Years since quittin.2     Passive exposure: Never    Smokeless tobacco: Never   Vaping Use    Vaping status: Every Day    Substances: Flavoring   Substance Use Topics    Alcohol use: Not Currently     Comment: social    Drug use: Never      E-Cigarette/Vaping    E-Cigarette Use Current Every Day User       E-Cigarette/Vaping Substances    Nicotine No     Flavoring Yes       I have reviewed and agree with the history as documented.     This is a 40-year-old female presenting to the emergency department today for evaluation of chest pain sternal to right sided in nature onset was at 1200 hrs. today.  She describes it as a squeezing sensation.  Denies shortness of breath.  She also has a history of left leg edema that she states has been ongoing for a few years has had venous duplex a few months ago that was not no relief or emboli.  She alleges she has a history of A-fib however currently normal sinus with PVCs.      Chest Pain  Associated symptoms: no abdominal pain, no cough, no dizziness, no dysphagia, no fever, no headache, no numbness, no shortness of breath, not vomiting and no weakness        Review of Systems   Constitutional: Negative.  Negative for chills and fever.   HENT: Negative.  Negative for sore throat and trouble swallowing.    Eyes: Negative.    Respiratory: Negative.  Negative for cough, shortness of breath and wheezing.    Cardiovascular:  Positive for chest pain. Negative for leg swelling.   Gastrointestinal: Negative.  Negative for abdominal pain, blood in stool and vomiting.   Endocrine: Negative.    Genitourinary: Negative.     Musculoskeletal: Negative.  Negative for neck stiffness.        Left leg pain swelling   Skin: Negative.    Allergic/Immunologic: Negative.    Neurological: Negative.  Negative for dizziness, seizures, speech difficulty, weakness, light-headedness, numbness and headaches.   Hematological: Negative.    Psychiatric/Behavioral: Negative.     All other systems reviewed and are negative.          Objective       ED Triage Vitals [04/01/25 1750]   Temperature Pulse Blood Pressure Respirations SpO2 Patient Position - Orthostatic VS   98.1 °F (36.7 °C) 73 123/72 14 97 % Sitting      Temp Source Heart Rate Source BP Location FiO2 (%) Pain Score    Temporal Monitor Right arm -- 7      Vitals      Date and Time Temp Pulse SpO2 Resp BP Pain Score FACES Pain Rating User   04/01/25 2015 98.6 °F (37 °C) 74 98 % 16 139/88 7 -- LC   04/01/25 1930 98.2 °F (36.8 °C) 72 98 % 16 117/68 7 -- LC   04/01/25 1830 98.3 °F (36.8 °C) 73 97 % 20 130/90 7 -- KTR   04/01/25 1750 98.1 °F (36.7 °C) 73 97 % 14 123/72 7 -- AB            Physical Exam  Constitutional:       General: She is not in acute distress.     Appearance: She is well-developed. She is not ill-appearing, toxic-appearing or diaphoretic.   HENT:      Right Ear: External ear normal. No swelling. Tympanic membrane is not bulging.      Left Ear: External ear normal. No swelling. Tympanic membrane is not bulging.      Nose: Nose normal.      Mouth/Throat:      Pharynx: No oropharyngeal exudate.   Eyes:      General: Lids are normal.      Conjunctiva/sclera: Conjunctivae normal.      Pupils: Pupils are equal, round, and reactive to light.   Neck:      Thyroid: No thyromegaly.      Vascular: No JVD.      Trachea: No tracheal deviation.   Cardiovascular:      Rate and Rhythm: Normal rate and regular rhythm.      Pulses: Normal pulses.      Heart sounds: Normal heart sounds. No murmur heard.     No friction rub. No gallop.   Pulmonary:      Effort: Pulmonary effort is normal. No  respiratory distress.      Breath sounds: Normal breath sounds. No stridor. No wheezing or rales.   Chest:      Chest wall: No tenderness.   Abdominal:      General: Bowel sounds are normal. There is no distension.      Palpations: Abdomen is soft. There is no mass.      Tenderness: There is no abdominal tenderness. There is no guarding or rebound.      Hernia: No hernia is present.   Musculoskeletal:         General: Normal range of motion.      Cervical back: Normal range of motion and neck supple. No edema. Normal range of motion.      Right lower leg: No tenderness. No edema.      Left lower leg: No tenderness. Edema present.   Lymphadenopathy:      Cervical: No cervical adenopathy.   Skin:     General: Skin is warm and dry.      Coloration: Skin is not pale.      Findings: No erythema or rash.   Neurological:      Mental Status: She is alert and oriented to person, place, and time.      GCS: GCS eye subscore is 4. GCS verbal subscore is 5. GCS motor subscore is 6.      Cranial Nerves: No cranial nerve deficit.      Sensory: No sensory deficit.      Deep Tendon Reflexes: Reflexes are normal and symmetric.   Psychiatric:         Speech: Speech normal.         Behavior: Behavior normal.         Results Reviewed       Procedure Component Value Units Date/Time    HS Troponin I 2hr [[id number]] Collected: 04/01/25 2006    Lab Status: Final result Specimen: Blood from Arm, Right Updated: 04/01/25 2038     hs TnI 2hr <2 ng/L      Delta 2hr hsTnI --    HS Troponin 0hr (reflex protocol) [[id number]]  (Normal) Collected: 04/01/25 1815    Lab Status: Final result Specimen: Blood from Arm, Right Updated: 04/01/25 1848     hs TnI 0hr <2 ng/L     B-Type Natriuretic Peptide(BNP) [[id number]]  (Normal) Collected: 04/01/25 1815    Lab Status: Final result Specimen: Blood from Arm, Right Updated: 04/01/25 1847     BNP 22 pg/mL     Comprehensive metabolic panel [[id number]]  (Abnormal) Collected: 04/01/25 1815    Lab Status: Final  result Specimen: Blood from Arm, Right Updated: 04/01/25 1844     Sodium 139 mmol/L      Potassium 3.4 mmol/L      Chloride 103 mmol/L      CO2 29 mmol/L      ANION GAP 7 mmol/L      BUN 13 mg/dL      Creatinine 0.60 mg/dL      Glucose 91 mg/dL      Calcium 9.4 mg/dL      AST 13 U/L      ALT 13 U/L      Alkaline Phosphatase 60 U/L      Total Protein 7.3 g/dL      Albumin 4.3 g/dL      Total Bilirubin 0.39 mg/dL      eGFR 114 ml/min/1.73sq m     Narrative:      National Kid[employer] Disease Foundation guidelines for Chronic Kidney Disease (CKD):     Stage 1 with normal or high GFR (GFR > 90 mL/min/1.73 square meters)    Stage 2 Mild CKD (GFR = 60-89 mL/min/1.73 square meters)    Stage 3A Moderate CKD (GFR = 45-59 mL/min/1.73 square meters)    Stage 3B Moderate CKD (GFR = 30-44 mL/min/1.73 square meters)    Stage 4 Severe CKD (GFR = 15-29 mL/min/1.73 square meters)    Stage 5 End Stage CKD (GFR <15 mL/min/1.73 square meters)  Note: GFR calculation is accurate only with a steady state creatinine    D-Dimer [[id number]]  (Abnormal) Collected: 04/01/25 1815    Lab Status: Final result Specimen: Blood from Arm, Right Updated: 04/01/25 1840     D-Dimer, Quant 0.71 ug/ml FEU     CBC and differential [[id number]] Collected: 04/01/25 1815    Lab Status: Final result Specimen: Blood from Arm, Right Updated: 04/01/25 1826     WBC 7.51 Thousand/uL      RBC 4.03 Million/uL      Hemoglobin 11.7 g/dL      Hematocrit 35.9 %      MCV 89 fL      MCH 29.0 pg      MCHC 32.6 g/dL      RDW 12.2 %      MPV 9.8 fL      Platelets 312 Thousands/uL      nRBC 0 /100 WBCs      Segmented % 63 %      Immature Grans % 0 %      Lymphocytes % 28 %      Monocytes % 7 %      Eosinophils Relative 1 %      Basophils Relative 1 %      Absolute Neutrophils 4.76 Thousands/µL      Absolute Immature Grans 0.01 Thousand/uL      Absolute Lymphocytes 2.08 Thousands/µL      Absolute Monocytes 0.53 Thousand/µL      Eosinophils Absolute 0.09 Thousand/µL      Basophils  Absolute 0.04 Thousands/µL             CTA chest pe study   Final Interpretation by Chava Cast MD (04/01 2045)      No pulmonary embolism.      Left upper lobe 5 mm pulmonary nodule. Based on current Fleischner Society 2017 Guidelines on incidental pulmonary nodule, no routine follow-up is needed if the patient is low risk. If the patient is high risk, optional follow-up chest CT at 12 months    can be considered.      Mildly enlarged mediastinal and hilar lymph nodes, nonspecific.      The study was marked in EPIC for immediate notification.               Workstation performed: [id number]         XR chest 1 view portable   ED Interpretation by Ken Hidalgo PA-C (04/01 1952)   Personally interpreted no evidence of acute cardiopulmonary process.      VAS lower limb venous duplex study, unilateral/limited    (Results Pending)       ECG 12 Lead Documentation Only    Date/Time: 4/1/2025 6:19 PM    Performed by: Ken Hidalgo PA-C  Authorized by: Ken Hidalgo PA-C    Indications / Diagnosis:  Chest pain  ECG reviewed by me, the ED Provider: yes    Patient location:  ED  Previous ECG:     Comparison to cardiac monitor: Yes    Interpretation:     Interpretation: normal    Rate:     ECG rate:  105    ECG rate assessment: tachycardic    Rhythm:     Rhythm: sinus tachycardia    Ectopy:     Ectopy: PVCs      PVCs:  Unifocal  QRS:     QRS axis:  Normal    QRS intervals:  Normal  Conduction:     Conduction: normal    ST segments:     ST segments:  Normal  T waves:     T waves: normal        ED Medication and Procedure Management   Prior to Admission Medications   Prescriptions Last Dose Informant Patient Reported? Taking?   albuterol (2.5 mg/3 mL) 0.083 % nebulizer solution 3/31/2025  No Yes   Sig: Take 3 mL (2.5 mg total) by nebulization every 6 (six) hours as needed for wheezing or shortness of breath   hydroCHLOROthiazide 12.5 mg capsule Not Taking  Yes No   Sig: Take 12.5 mg by mouth    Patient not taking: Reported on 4/1/2025   ibuprofen (MOTRIN) 600 mg tablet 3/31/2025  No Yes   Sig: Take 1 tablet (600 mg total) by mouth every 6 (six) hours as needed for moderate pain      Facility-Administered Medications: None     Patient's Medications   Discharge Prescriptions    No medications on file     No discharge procedures on file.  ED SEPSIS DOCUMENTATION   Time reflects when diagnosis was documented in both MDM as applicable and the Disposition within this note       Time User Action Codes Description Comment    4/1/2025  8:52 PM Ken Hidalgo Add [R91.1] Pulmonary nodule     4/1/2025  8:55 PM Ken Hidalgo Add [R07.9] Chest pain                  Ken Hidalgo PA-C  04/01/25 2058

## 2025-04-02 LAB
ATRIAL RATE: 69 BPM
P AXIS: 58 DEGREES
PR INTERVAL: 194 MS
QRS AXIS: 64 DEGREES
QRSD INTERVAL: 90 MS
QT INTERVAL: 404 MS
QTC INTERVAL: 432 MS
T WAVE AXIS: 48 DEGREES
VENTRICULAR RATE: 69 BPM

## 2025-04-02 PROCEDURE — 93010 ELECTROCARDIOGRAM REPORT: CPT | Performed by: INTERNAL MEDICINE

## 2025-06-20 LAB
ATRIAL RATE: 75 BPM
P AXIS: 49 DEGREES
PR INTERVAL: 186 MS
QRS AXIS: 41 DEGREES
QRSD INTERVAL: 92 MS
QT INTERVAL: 402 MS
QTC INTERVAL: 449 MS
T WAVE AXIS: 20 DEGREES
VENTRICULAR RATE: 75 BPM

## 2025-07-03 DIAGNOSIS — R91.1 SOLITARY PULMONARY NODULE: ICD-10-CM

## 2025-08-06 ENCOUNTER — OFFICE VISIT (OUTPATIENT)
Dept: URGENT CARE | Facility: CLINIC | Age: 40
End: 2025-08-06
Payer: COMMERCIAL

## 2025-08-06 ENCOUNTER — APPOINTMENT (OUTPATIENT)
Dept: URGENT CARE | Facility: CLINIC | Age: 40
End: 2025-08-06
Payer: COMMERCIAL

## 2025-08-06 VITALS
DIASTOLIC BLOOD PRESSURE: 60 MMHG | OXYGEN SATURATION: 100 % | SYSTOLIC BLOOD PRESSURE: 100 MMHG | HEIGHT: 69 IN | TEMPERATURE: 97.7 F | WEIGHT: 232 LBS | HEART RATE: 74 BPM | BODY MASS INDEX: 34.36 KG/M2 | RESPIRATION RATE: 18 BRPM

## 2025-08-06 DIAGNOSIS — S46.911A MUSCLE STRAIN OF RIGHT SCAPULAR REGION, INITIAL ENCOUNTER: ICD-10-CM

## 2025-08-06 DIAGNOSIS — S09.90XA CLOSED HEAD INJURY, INITIAL ENCOUNTER: Primary | ICD-10-CM

## 2025-08-06 PROCEDURE — G0382 LEV 3 HOSP TYPE B ED VISIT: HCPCS
